# Patient Record
Sex: FEMALE | Race: WHITE | NOT HISPANIC OR LATINO | URBAN - METROPOLITAN AREA
[De-identification: names, ages, dates, MRNs, and addresses within clinical notes are randomized per-mention and may not be internally consistent; named-entity substitution may affect disease eponyms.]

---

## 2017-05-01 ENCOUNTER — OUTPATIENT (OUTPATIENT)
Dept: OUTPATIENT SERVICES | Facility: HOSPITAL | Age: 77
LOS: 1 days | Discharge: HOME | End: 2017-05-01

## 2017-06-28 DIAGNOSIS — Z00.00 ENCOUNTER FOR GENERAL ADULT MEDICAL EXAMINATION WITHOUT ABNORMAL FINDINGS: ICD-10-CM

## 2017-07-31 ENCOUNTER — OUTPATIENT (OUTPATIENT)
Dept: OUTPATIENT SERVICES | Facility: HOSPITAL | Age: 77
LOS: 1 days | Discharge: HOME | End: 2017-07-31

## 2017-07-31 DIAGNOSIS — Z12.31 ENCOUNTER FOR SCREENING MAMMOGRAM FOR MALIGNANT NEOPLASM OF BREAST: ICD-10-CM

## 2017-10-20 ENCOUNTER — OUTPATIENT (OUTPATIENT)
Dept: OUTPATIENT SERVICES | Facility: HOSPITAL | Age: 77
LOS: 1 days | Discharge: HOME | End: 2017-10-20

## 2017-10-20 DIAGNOSIS — E78.01 FAMILIAL HYPERCHOLESTEROLEMIA: ICD-10-CM

## 2017-10-20 DIAGNOSIS — J01.90 ACUTE SINUSITIS, UNSPECIFIED: ICD-10-CM

## 2017-10-20 DIAGNOSIS — E78.00 PURE HYPERCHOLESTEROLEMIA, UNSPECIFIED: ICD-10-CM

## 2018-04-18 ENCOUNTER — OUTPATIENT (OUTPATIENT)
Dept: OUTPATIENT SERVICES | Facility: HOSPITAL | Age: 78
LOS: 1 days | Discharge: HOME | End: 2018-04-18

## 2018-04-18 DIAGNOSIS — R31.9 HEMATURIA, UNSPECIFIED: ICD-10-CM

## 2018-08-07 ENCOUNTER — OUTPATIENT (OUTPATIENT)
Dept: OUTPATIENT SERVICES | Facility: HOSPITAL | Age: 78
LOS: 1 days | Discharge: HOME | End: 2018-08-07

## 2018-08-07 DIAGNOSIS — Z12.31 ENCOUNTER FOR SCREENING MAMMOGRAM FOR MALIGNANT NEOPLASM OF BREAST: ICD-10-CM

## 2018-08-20 ENCOUNTER — OUTPATIENT (OUTPATIENT)
Dept: OUTPATIENT SERVICES | Facility: HOSPITAL | Age: 78
LOS: 1 days | Discharge: HOME | End: 2018-08-20

## 2018-08-20 DIAGNOSIS — R92.8 OTHER ABNORMAL AND INCONCLUSIVE FINDINGS ON DIAGNOSTIC IMAGING OF BREAST: ICD-10-CM

## 2019-08-20 ENCOUNTER — OUTPATIENT (OUTPATIENT)
Dept: OUTPATIENT SERVICES | Facility: HOSPITAL | Age: 79
LOS: 1 days | Discharge: HOME | End: 2019-08-20
Payer: MEDICARE

## 2019-08-20 DIAGNOSIS — Z12.31 ENCOUNTER FOR SCREENING MAMMOGRAM FOR MALIGNANT NEOPLASM OF BREAST: ICD-10-CM

## 2019-08-20 PROCEDURE — 77063 BREAST TOMOSYNTHESIS BI: CPT | Mod: 26

## 2019-08-20 PROCEDURE — 77067 SCR MAMMO BI INCL CAD: CPT | Mod: 26

## 2019-08-29 ENCOUNTER — OUTPATIENT (OUTPATIENT)
Dept: OUTPATIENT SERVICES | Facility: HOSPITAL | Age: 79
LOS: 1 days | Discharge: HOME | End: 2019-08-29
Payer: MEDICARE

## 2019-08-29 DIAGNOSIS — Z12.31 ENCOUNTER FOR SCREENING MAMMOGRAM FOR MALIGNANT NEOPLASM OF BREAST: ICD-10-CM

## 2019-08-29 DIAGNOSIS — R92.8 OTHER ABNORMAL AND INCONCLUSIVE FINDINGS ON DIAGNOSTIC IMAGING OF BREAST: ICD-10-CM

## 2019-08-29 PROCEDURE — G0279: CPT | Mod: 26,LT

## 2019-08-29 PROCEDURE — 76642 ULTRASOUND BREAST LIMITED: CPT | Mod: 26,LT

## 2019-08-29 PROCEDURE — 77065 DX MAMMO INCL CAD UNI: CPT | Mod: 26,LT

## 2019-11-13 ENCOUNTER — OUTPATIENT (OUTPATIENT)
Dept: OUTPATIENT SERVICES | Facility: HOSPITAL | Age: 79
LOS: 1 days | Discharge: HOME | End: 2019-11-13
Payer: MEDICARE

## 2019-11-13 DIAGNOSIS — R51 HEADACHE: ICD-10-CM

## 2019-11-13 DIAGNOSIS — M48.03 SPINAL STENOSIS, CERVICOTHORACIC REGION: ICD-10-CM

## 2019-11-13 PROCEDURE — 72141 MRI NECK SPINE W/O DYE: CPT | Mod: 26

## 2019-11-20 ENCOUNTER — OUTPATIENT (OUTPATIENT)
Dept: OUTPATIENT SERVICES | Facility: HOSPITAL | Age: 79
LOS: 1 days | Discharge: HOME | End: 2019-11-20

## 2019-11-20 DIAGNOSIS — G24.3 SPASMODIC TORTICOLLIS: ICD-10-CM

## 2020-09-09 ENCOUNTER — OUTPATIENT (OUTPATIENT)
Dept: OUTPATIENT SERVICES | Facility: HOSPITAL | Age: 80
LOS: 1 days | Discharge: HOME | End: 2020-09-09
Payer: MEDICARE

## 2020-09-09 DIAGNOSIS — Z12.31 ENCOUNTER FOR SCREENING MAMMOGRAM FOR MALIGNANT NEOPLASM OF BREAST: ICD-10-CM

## 2020-09-09 PROCEDURE — 77067 SCR MAMMO BI INCL CAD: CPT | Mod: 26

## 2020-09-09 PROCEDURE — 77063 BREAST TOMOSYNTHESIS BI: CPT | Mod: 26

## 2021-02-16 ENCOUNTER — OUTPATIENT (OUTPATIENT)
Dept: OUTPATIENT SERVICES | Facility: HOSPITAL | Age: 81
LOS: 1 days | Discharge: HOME | End: 2021-02-16

## 2021-02-16 DIAGNOSIS — Z11.59 ENCOUNTER FOR SCREENING FOR OTHER VIRAL DISEASES: ICD-10-CM

## 2021-02-19 ENCOUNTER — OUTPATIENT (OUTPATIENT)
Dept: OUTPATIENT SERVICES | Facility: HOSPITAL | Age: 81
LOS: 1 days | Discharge: HOME | End: 2021-02-19

## 2021-02-19 VITALS
SYSTOLIC BLOOD PRESSURE: 174 MMHG | RESPIRATION RATE: 18 BRPM | HEART RATE: 86 BPM | TEMPERATURE: 98 F | HEIGHT: 61 IN | OXYGEN SATURATION: 98 % | WEIGHT: 160.06 LBS | DIASTOLIC BLOOD PRESSURE: 84 MMHG

## 2021-02-19 VITALS — RESPIRATION RATE: 17 BRPM | SYSTOLIC BLOOD PRESSURE: 158 MMHG | HEART RATE: 62 BPM | DIASTOLIC BLOOD PRESSURE: 77 MMHG

## 2021-02-19 DIAGNOSIS — Z98.890 OTHER SPECIFIED POSTPROCEDURAL STATES: Chronic | ICD-10-CM

## 2021-02-19 RX ORDER — LEVOTHYROXINE SODIUM 125 MCG
1 TABLET ORAL
Qty: 0 | Refills: 0 | DISCHARGE

## 2021-02-19 RX ORDER — LISINOPRIL 2.5 MG/1
1 TABLET ORAL
Qty: 0 | Refills: 0 | DISCHARGE

## 2021-02-19 RX ORDER — IBUPROFEN 200 MG
1 TABLET ORAL
Qty: 20 | Refills: 0
Start: 2021-02-19

## 2021-02-19 NOTE — ASU PREOP CHECKLIST - BMI (KG/M2)
30.2 Patient contacted regarding COVID-19 diagnosis. Discussed COVID-19 related testing which was pending at this time. Test results were pending. Patient informed of results, if available? Patient and his mother informed that he can get test results from [address] when they become available. Outreach made within 2 business days of discharge: Yes Care Transition Nurse/ Ambulatory Care Manager/ LPN Care Coordinator contacted the patient by telephone to perform post discharge assessment. Verified name and  with patient as identifiers. Provided introduction to self, and explanation of the CTN/ACM/LPN role, and reason for call due to risk factors for infection and/or exposure to COVID-19. Symptoms reviewed with patient who verbalized the following symptoms: no new/worsening symptoms. Due to no new or worsening symptoms encounter was not routed to provider for escalation. Discussed follow-up appointments. If no appointment was previously scheduled, appointment scheduling offered: [facility] follow up appointment(s):  
Future Appointments Date Time Provider Carla Fleming 11/3/2020  9:30 AM [facility]SS [facility] up appointment(s): INOCENCIO Ramos 10/26/20 @ 2PM  
  
Advance Care Planning:  
Does patient have an Advance Directive: currently not on file; patient declined education Patient has following risk factors of: no known risk factors. CTN/ACM/LPN reviewed discharge instructions, medical action plan and red flags such as increased shortness of breath, increasing fever and signs of decompensation with patient and mother who verbalized understanding. Discussed exposure protocols and quarantine with CDC Guidelines What to do if you are sick with coronavirus disease .  Patient and mother were given an opportunity for questions and concerns.  The patient agrees to contact the [facility] exposure line [phone], [facility] department K[address] [address]  ([phone]) and PCP office for questions related to their healthcare. CTN/ACM provided contact information for future needs. Reviewed and educated patient on any new and changed medications related to discharge diagnosis. Patient/family/caregiver given information for [address] and agrees to enroll no Patient's preferred e-mail:  NA 
Patient's preferred phone number: NA Based on Loop alert triggers, patient will be contacted by nurse care manager for worsening symptoms. Plan for follow-up call in 1-2 days based on severity of symptoms and risk factors. Reviewed self-quarantine instructions with patient. Explained to patient self-quarantine is isolating self to one room and using one bathroom, if possible and to avoid contact with family and others for a period of 14 days to avoid spreading illness. Patient and mother verbalizes understanding.

## 2021-02-23 DIAGNOSIS — I10 ESSENTIAL (PRIMARY) HYPERTENSION: ICD-10-CM

## 2021-02-23 DIAGNOSIS — G56.01 CARPAL TUNNEL SYNDROME, RIGHT UPPER LIMB: ICD-10-CM

## 2021-09-23 ENCOUNTER — OUTPATIENT (OUTPATIENT)
Dept: OUTPATIENT SERVICES | Facility: HOSPITAL | Age: 81
LOS: 1 days | Discharge: HOME | End: 2021-09-23
Payer: MEDICARE

## 2021-09-23 DIAGNOSIS — Z12.31 ENCOUNTER FOR SCREENING MAMMOGRAM FOR MALIGNANT NEOPLASM OF BREAST: ICD-10-CM

## 2021-09-23 DIAGNOSIS — Z98.890 OTHER SPECIFIED POSTPROCEDURAL STATES: Chronic | ICD-10-CM

## 2021-09-23 PROBLEM — Z87.898 PERSONAL HISTORY OF OTHER SPECIFIED CONDITIONS: Chronic | Status: ACTIVE | Noted: 2021-02-19

## 2021-09-23 PROCEDURE — 77063 BREAST TOMOSYNTHESIS BI: CPT | Mod: 26

## 2021-09-23 PROCEDURE — 77067 SCR MAMMO BI INCL CAD: CPT | Mod: 26

## 2022-10-24 ENCOUNTER — OUTPATIENT (OUTPATIENT)
Dept: OUTPATIENT SERVICES | Facility: HOSPITAL | Age: 82
LOS: 1 days | Discharge: HOME | End: 2022-10-24

## 2022-10-24 DIAGNOSIS — Z12.31 ENCOUNTER FOR SCREENING MAMMOGRAM FOR MALIGNANT NEOPLASM OF BREAST: ICD-10-CM

## 2022-10-24 DIAGNOSIS — Z98.890 OTHER SPECIFIED POSTPROCEDURAL STATES: Chronic | ICD-10-CM

## 2022-10-24 PROCEDURE — 77067 SCR MAMMO BI INCL CAD: CPT | Mod: 26

## 2022-10-24 PROCEDURE — 77063 BREAST TOMOSYNTHESIS BI: CPT | Mod: 26

## 2023-10-31 ENCOUNTER — OUTPATIENT (OUTPATIENT)
Dept: OUTPATIENT SERVICES | Facility: HOSPITAL | Age: 83
LOS: 1 days | End: 2023-10-31
Payer: MEDICARE

## 2023-10-31 DIAGNOSIS — Z98.890 OTHER SPECIFIED POSTPROCEDURAL STATES: Chronic | ICD-10-CM

## 2023-10-31 DIAGNOSIS — Z12.31 ENCOUNTER FOR SCREENING MAMMOGRAM FOR MALIGNANT NEOPLASM OF BREAST: ICD-10-CM

## 2023-10-31 PROCEDURE — 77063 BREAST TOMOSYNTHESIS BI: CPT | Mod: 26

## 2023-10-31 PROCEDURE — 77067 SCR MAMMO BI INCL CAD: CPT

## 2023-10-31 PROCEDURE — 77063 BREAST TOMOSYNTHESIS BI: CPT

## 2023-10-31 PROCEDURE — 77067 SCR MAMMO BI INCL CAD: CPT | Mod: 26

## 2023-11-01 DIAGNOSIS — Z12.31 ENCOUNTER FOR SCREENING MAMMOGRAM FOR MALIGNANT NEOPLASM OF BREAST: ICD-10-CM

## 2024-01-30 PROBLEM — Z00.00 ENCOUNTER FOR PREVENTIVE HEALTH EXAMINATION: Status: ACTIVE | Noted: 2024-01-30

## 2024-03-08 ENCOUNTER — APPOINTMENT (OUTPATIENT)
Dept: ORTHOPEDIC SURGERY | Facility: CLINIC | Age: 84
End: 2024-03-08

## 2024-04-30 ENCOUNTER — APPOINTMENT (OUTPATIENT)
Dept: ORTHOPEDIC SURGERY | Facility: CLINIC | Age: 84
End: 2024-04-30
Payer: MEDICARE

## 2024-04-30 VITALS — BODY MASS INDEX: 32.1 KG/M2 | WEIGHT: 170 LBS | HEIGHT: 61 IN

## 2024-04-30 DIAGNOSIS — G56.02 CARPAL TUNNEL SYNDROME, LEFT UPPER LIMB: ICD-10-CM

## 2024-04-30 PROCEDURE — 99202 OFFICE O/P NEW SF 15 MIN: CPT

## 2024-04-30 NOTE — PHYSICAL EXAM
[de-identified] : Patient is good range of motion of the left hand.  She has a mildly positive Tinel's medial compression test.  Good thenar strength with no thenar atrophy.  Minimally loss of sensation of the fingers.  No significant degenerative change

## 2024-04-30 NOTE — HISTORY OF PRESENT ILLNESS
[de-identified] : 83-year-old female is having numbness and tingling the left hand.  Does wake her up from sleep at night sometimes.  Bothers her throughout the day at times.  Does not bother her enough to warrant any intervention though.

## 2024-04-30 NOTE — ASSESSMENT
[FreeTextEntry1] : Patient did well from right-sided carpal tunnel release surgery.  She now has carpal tunnel on the left side.  However it is not clinically bothering her enough to warrant any intervention.  She will see me back on an as-needed basis.  Any other issues or problems contact the office the indications for surgery were discussed

## 2024-08-16 ENCOUNTER — APPOINTMENT (OUTPATIENT)
Dept: CARDIOTHORACIC SURGERY | Facility: CLINIC | Age: 84
End: 2024-08-16

## 2024-08-16 ENCOUNTER — APPOINTMENT (OUTPATIENT)
Dept: PULMONOLOGY | Facility: CLINIC | Age: 84
End: 2024-08-16
Payer: MEDICARE

## 2024-08-16 VITALS
OXYGEN SATURATION: 95 % | BODY MASS INDEX: 32 KG/M2 | WEIGHT: 163 LBS | HEIGHT: 60 IN | SYSTOLIC BLOOD PRESSURE: 130 MMHG | DIASTOLIC BLOOD PRESSURE: 70 MMHG | HEART RATE: 83 BPM

## 2024-08-16 VITALS
OXYGEN SATURATION: 95 % | WEIGHT: 163 LBS | HEIGHT: 60 IN | SYSTOLIC BLOOD PRESSURE: 142 MMHG | HEART RATE: 85 BPM | DIASTOLIC BLOOD PRESSURE: 72 MMHG | RESPIRATION RATE: 15 BRPM | TEMPERATURE: 98 F | BODY MASS INDEX: 32 KG/M2

## 2024-08-16 DIAGNOSIS — Z86.39 PERSONAL HISTORY OF OTHER ENDOCRINE, NUTRITIONAL AND METABOLIC DISEASE: ICD-10-CM

## 2024-08-16 DIAGNOSIS — R91.8 OTHER NONSPECIFIC ABNORMAL FINDING OF LUNG FIELD: ICD-10-CM

## 2024-08-16 DIAGNOSIS — J44.9 CHRONIC OBSTRUCTIVE PULMONARY DISEASE, UNSPECIFIED: ICD-10-CM

## 2024-08-16 DIAGNOSIS — I10 ESSENTIAL (PRIMARY) HYPERTENSION: ICD-10-CM

## 2024-08-16 PROCEDURE — 99204 OFFICE O/P NEW MOD 45 MIN: CPT

## 2024-08-16 PROCEDURE — G2211 COMPLEX E/M VISIT ADD ON: CPT

## 2024-08-16 RX ORDER — ATORVASTATIN CALCIUM 20 MG/1
20 TABLET, FILM COATED ORAL
Refills: 0 | Status: ACTIVE | COMMUNITY

## 2024-08-16 RX ORDER — LEVOTHYROXINE SODIUM 88 UG/1
88 TABLET ORAL
Refills: 0 | Status: ACTIVE | COMMUNITY

## 2024-08-16 RX ORDER — CRANBERRY FRUIT EXTRACT 200 MG
CAPSULE ORAL
Refills: 0 | Status: ACTIVE | COMMUNITY

## 2024-08-16 RX ORDER — LISINOPRIL 20 MG/1
20 TABLET ORAL
Refills: 0 | Status: ACTIVE | COMMUNITY

## 2024-08-16 NOTE — PHYSICAL EXAM
[General Appearance - Alert] : alert [General Appearance - In No Acute Distress] : in no acute distress [Sclera] : the sclera and conjunctiva were normal [PERRL With Normal Accommodation] : pupils were equal in size, round, and reactive to light [Extraocular Movements] : extraocular movements were intact [Outer Ear] : the ears and nose were normal in appearance [Oropharynx] : the oropharynx was normal [Neck Appearance] : the appearance of the neck was normal [Jugular Venous Distention Increased] : there was no jugular-venous distention [Neck Cervical Mass (___cm)] : no neck mass was observed [Thyroid Diffuse Enlargement] : the thyroid was not enlarged [Thyroid Nodule] : there were no palpable thyroid nodules [Auscultation Breath Sounds / Voice Sounds] : lungs were clear to auscultation bilaterally [Heart Rate And Rhythm] : heart rate was normal and rhythm regular [Heart Sounds] : normal S1 and S2 [Heart Sounds Gallop] : no gallops [Murmurs] : no murmurs [Heart Sounds Pericardial Friction Rub] : no pericardial rub [Examination Of The Chest] : the chest was normal in appearance [Chest Visual Inspection Thoracic Asymmetry] : no chest asymmetry [Diminished Respiratory Excursion] : normal chest expansion [Bowel Sounds] : normal bowel sounds [Abdomen Tenderness] : non-tender [Abdomen Soft] : soft [Abdomen Mass (___ Cm)] : no abdominal mass palpated [Abnormal Walk] : normal gait [Nail Clubbing] : no clubbing  or cyanosis of the fingernails [Musculoskeletal - Swelling] : no joint swelling seen [Motor Tone] : muscle strength and tone were normal [Skin Color & Pigmentation] : normal skin color and pigmentation [Skin Turgor] : normal skin turgor [] : no rash [Deep Tendon Reflexes (DTR)] : deep tendon reflexes were 2+ and symmetric [Sensation] : the sensory exam was normal to light touch and pinprick [No Focal Deficits] : no focal deficits [Oriented To Time, Place, And Person] : oriented to person, place, and time [Impaired Insight] : insight and judgment were intact [Affect] : the affect was normal

## 2024-08-16 NOTE — REASON FOR VISIT
[Initial] : an initial visit [Abnormal CXR/ Chest CT] : an abnormal CXR/ chest CT [Lung Cancer] : lung cancer [COPD] : COPD

## 2024-08-16 NOTE — HISTORY OF PRESENT ILLNESS
[TextBox_4] : 83 years old presented for above was complaining of cough intermittent saw her PMD had a chest x-ray done which showed left upper lobe opacity was given antibiotics repeat chest x-ray done few weeks later unchanged opacity underwent CT chest on August 1 which showed left upper lobe spiculated nodule and multiple bilateral nodular right lower lobe and left upper lobe underwent PET scan showed uptake in the left upper lobe nodule highly suspicious for malignancy last chest x-ray was 2008 longtime smoker 20 years ago denies history of COPD recurrent bronchitis or shortness of breath

## 2024-08-16 NOTE — DISCUSSION/SUMMARY
[FreeTextEntry1] : Left upper lobe spiculated nodule ex heavy smoker Highly malignancy Multiple nodules PET negative and likely related to left upper lobe mass Plan PFTs Referred for CT surgery  Cardio eval Spoke with surgery

## 2024-08-16 NOTE — PHYSICAL EXAM
[General Appearance - Alert] : alert [General Appearance - In No Acute Distress] : in no acute distress [Sclera] : the sclera and conjunctiva were normal [PERRL With Normal Accommodation] : pupils were equal in size, round, and reactive to light [Extraocular Movements] : extraocular movements were intact [Outer Ear] : the ears and nose were normal in appearance [Oropharynx] : the oropharynx was normal [Neck Appearance] : the appearance of the neck was normal [Neck Cervical Mass (___cm)] : no neck mass was observed [Jugular Venous Distention Increased] : there was no jugular-venous distention [Thyroid Diffuse Enlargement] : the thyroid was not enlarged [Thyroid Nodule] : there were no palpable thyroid nodules [Auscultation Breath Sounds / Voice Sounds] : lungs were clear to auscultation bilaterally [Heart Sounds] : normal S1 and S2 [Heart Rate And Rhythm] : heart rate was normal and rhythm regular [Heart Sounds Gallop] : no gallops [Murmurs] : no murmurs [Heart Sounds Pericardial Friction Rub] : no pericardial rub [Examination Of The Chest] : the chest was normal in appearance [Chest Visual Inspection Thoracic Asymmetry] : no chest asymmetry [Diminished Respiratory Excursion] : normal chest expansion [Bowel Sounds] : normal bowel sounds [Abdomen Tenderness] : non-tender [Abdomen Soft] : soft [Abdomen Mass (___ Cm)] : no abdominal mass palpated [Abnormal Walk] : normal gait [Nail Clubbing] : no clubbing  or cyanosis of the fingernails [Musculoskeletal - Swelling] : no joint swelling seen [Motor Tone] : muscle strength and tone were normal [Skin Color & Pigmentation] : normal skin color and pigmentation [Skin Turgor] : normal skin turgor [] : no rash [Deep Tendon Reflexes (DTR)] : deep tendon reflexes were 2+ and symmetric [Sensation] : the sensory exam was normal to light touch and pinprick [No Focal Deficits] : no focal deficits [Impaired Insight] : insight and judgment were intact [Oriented To Time, Place, And Person] : oriented to person, place, and time [Affect] : the affect was normal

## 2024-08-19 ENCOUNTER — APPOINTMENT (OUTPATIENT)
Dept: CARDIOLOGY | Facility: CLINIC | Age: 84
End: 2024-08-19
Payer: MEDICARE

## 2024-08-19 VITALS
BODY MASS INDEX: 32.59 KG/M2 | SYSTOLIC BLOOD PRESSURE: 116 MMHG | HEART RATE: 66 BPM | WEIGHT: 166 LBS | DIASTOLIC BLOOD PRESSURE: 70 MMHG | HEIGHT: 60 IN

## 2024-08-19 DIAGNOSIS — R91.8 OTHER NONSPECIFIC ABNORMAL FINDING OF LUNG FIELD: ICD-10-CM

## 2024-08-19 DIAGNOSIS — I10 ESSENTIAL (PRIMARY) HYPERTENSION: ICD-10-CM

## 2024-08-19 DIAGNOSIS — E78.5 HYPERLIPIDEMIA, UNSPECIFIED: ICD-10-CM

## 2024-08-19 DIAGNOSIS — Z87.891 PERSONAL HISTORY OF NICOTINE DEPENDENCE: ICD-10-CM

## 2024-08-19 DIAGNOSIS — Z01.810 ENCOUNTER FOR PREPROCEDURAL CARDIOVASCULAR EXAMINATION: ICD-10-CM

## 2024-08-19 PROCEDURE — 93000 ELECTROCARDIOGRAM COMPLETE: CPT

## 2024-08-19 PROCEDURE — 99204 OFFICE O/P NEW MOD 45 MIN: CPT | Mod: 25

## 2024-08-21 ENCOUNTER — RESULT REVIEW (OUTPATIENT)
Age: 84
End: 2024-08-21

## 2024-08-22 ENCOUNTER — APPOINTMENT (OUTPATIENT)
Dept: PULMONOLOGY | Facility: HOSPITAL | Age: 84
End: 2024-08-22

## 2024-08-23 ENCOUNTER — APPOINTMENT (OUTPATIENT)
Dept: CARDIOLOGY | Facility: CLINIC | Age: 84
End: 2024-08-23
Payer: MEDICARE

## 2024-08-23 PROCEDURE — 93306 TTE W/DOPPLER COMPLETE: CPT

## 2024-08-23 NOTE — DATA REVIEWED
[FreeTextEntry1] : Full Report IMPRESSION:   1.  FDG avid dominant 2.8 cm spiculated solid lung nodule in the left upper lobe, probably representing a primary lung tumor. Histologic correlation is suggested.   2.  Remaining multiple solid and subsolid lung nodules are not FDG avid and indeterminate. Continued close imaging surveillance by chest CT is suggested..   3.  No FDG avid intrathoracic adenopathy or distant metastases.     Diagnostic confidence level used in this report:   Consistent with/compatible with or no modifier - greater than 98% Most likely - greater than 90% Likely/probably - greater than 75% Possibly 50% Less likely - less than 25% Unlikely - less than 5%     PET CT FDG SKULL BASE MID THIGH WITHOUT IV CONTRAST   CLINICAL INDICATION: 83-year-old female with spiculated lung nodule on recent chest CT   COMPARISON: 8/1/2024 chest CT   TECHNIQUE: Radiopharmaceutical: 13.1 mCi IV F-18 FDG Uptake time: 1h5min POC glucose: 102 mg/dL  Field of view: Skull base to mid-thigh Contrast: IV contrast was not administered. CT protocol: Designed for attenuation correction of PET and used for anatomic localization of PET findings. Not optimized for anatomical resolution and contrast. This low-dose  CT cannot replace state-of-the-art diagnostic CT scans with dedicated protocols for different body parts and indications. Reporting: Standardized uptake values (SUV) are normalized to lean body mass (LBM) and indicate the highest activity (SUVmax) at a given site. Note that LBM normalization may result in lower SUV than body-weight normalization.     FINDINGS:   Head and neck:   There is no suspicious FDG uptake in the head and neck.   No head and neck lymphadenopathy.   Prior total thyroidectomy.   Chest:   Increased FDG uptake is seen within the dominant spiculated solid lung nodule in the left upper lobe, 2.8 x 2.3 cm on series 102 image 54, SUV 4.8, probably presenting a primary lung tumor.   No measurable FDG uptake above background activity within the remaining bilateral lung nodules, with the smaller nodules not appreciated with certainty on the current study secondary to differences in technique. Some of these are annotated or measured on series 102, including a 9 mm nodule in the left upper lobe with a central lucency image 43, a 3.8 x 2 cm subsolid consolidative opacity in the posterior right lower lobe image 65, a 10 mm solid lung nodule in the middle lobe image 69, and a 9 mm subpleural solid nodule in the posterior basilar left lower lobe image 85.   There is no hilar, mediastinal, supraclavicular or axillary lymphadenopathy. No pleural or pericardial effusions.  Mild coronary artery calcifications.   Abdomen and pelvis:   There is no suspicious FDG uptake in the abdomen/pelvis.   On CT, there is a large 7.0 x 5.5 cm fat-containing left adrenal mass, consistent with a myelolipoma. Multiple scattered non-FDG avid low-attenuation liver lesions are seen, most of which represent cysts measuring up to 2.3 cm in the left hepatic lobe. Additional 2.8 cm subcapsular low-attenuation lesion at the periphery of the right hepatic lobe may represent a hemangioma. Remaining solid abdominal organs are unremarkable. Prior hysterectomy. No lymphadenopathy.  No acute bowel-related abnormality.    Vascular calcifications of the aorta and its major branches.    Musculoskeletal and extremities:   There are no suspicious FDG-avid osseous lesions.   No aggressive osseous lesions are seen on CT.     Electronic Signature: I personally reviewed the images and agree with this report. Final Report: Dictated by  and Signed by Attending Drew Shankar MD 8/14/2024 11:42 AM

## 2024-08-23 NOTE — HISTORY OF PRESENT ILLNESS
[FreeTextEntry1] :  Ms. JAIRON HALL is a 83 year F, former smoker, quit 20 years ago, that arrives today for a consultation for a SYLVIA 2.8 X 2.3 cm nodule. with multiple soft tissue bilateral lung nodules. Patient was seen by their PMD for work up for a cough and incidental finding on xray, she was treated with abx for a SYLVIA opacity which yielded no improvement on lesion Patient then underwent CT chest which revealed a spiculated nodule in the SYLVIA, subsequently patient had a PET CT.  PMHX HTN, HLD, Hypothyroidism S/P Thyroid cancer s/p thyroidectomy, tx with radioactive iodine. Here today for surgical discussion.    Their Healthcare team is as follows: PMD: Dr. Kraft Cardiologist: None Pulmonologist: Reinaldo    ECOG, Independent, Partially Independent, Fully Dependent Lives with alone Former smoker- 80 pack years COVID History- Vaccinated 2021  Denies major psychiatric history Denies Family History of lung Cancer

## 2024-08-23 NOTE — ASSESSMENT
[FreeTextEntry1] : Ms. JAIRON HALL is a 83 year F, former smoker, quit 20 years ago, that arrives today for a consultation for a SYLVIA 2.8 X 2.3 cm nodule. with multiple soft tissue bilateral lung nodules. Patient was seen by their PMD for work up for a cough and incidental finding on xray, she was treated with abx for a SYLVIA opacity which yielded no improvement on lesion Patient then underwent CT chest which revealed a spiculated nodule in the SYLVIA, subsequently patient had a PET CT.  PMHX HTN, HLD, Hypothyroidism S/P Thyroid cancer s/p thyroidectomy, tx with radioactive iodine. Here today for surgical discussion. PET /CT images reviewed and discussed with patient and family. Alternatives discussed such as endobronchial Biopsy vs FNA Biopsy vs Surgical Resection, including associated risks and benefits. Patient amenable to surgery. Patient will bring in discs (@ RR) for uploading.  -Plan Bronchoscopy, Robotic assisted left thoracoscopy upper lobe wedge resection, possible segmentectomy / lobectomy mediastinal node dissection on 8/28/2004 Needs PAST Cardiac Clearance (has an appointment on 8/19/2024) PFTs I, Mak Worley saw, examined and reviewed the diagnostic images on patient:  JAIRON HALL on 08/16/2024 and agreed with my Nurse Practitioner's clinical note, physical exam findings and treatment plan.

## 2024-08-26 NOTE — HISTORY OF PRESENT ILLNESS
[FreeTextEntry1] : Ms. Gutierrez is an 83-year-old woman with history of HTN, HLD, hypothyroidism, prior tobacco use, and lung nodule presenting for pre-operative risk assessment.  Patient follows with Dr. Isaías Cortez and was last seen 8/16 for PET positive lung nodule. She saw Dr. Nacho Worley that day and is now planned for bronchoscopy, robotic assisted left thoracoscopy, upper lobe wedge resection, possible segmentectomy/lobectomy, and MSLND 8/28/24.  CT chest 8/2024 showed a SYLVIA spiculated nodule and multiple bilateral nodular RLL and SYLVIA nodules. PET showed uptake in the SYLVIA nodule, highly suspicious for malignancy. Mild coronary atherosclerosis noted.  She states that despite her lung nodule diagnosis she feels quite well. No chest pain or dyspnea on exertion. Can climb 2 flights of stairs without chest pain or effort limiting dyspnea.  ECG shows NSR, normal axis, borderline LVH by aVL  Meds: - Atorva 20mg - Lisinopril 20mg - Synthroid 88mcg - VitD3, Ca

## 2024-08-26 NOTE — ASSESSMENT
[FreeTextEntry1] : Ms. Gutierrez is an 83-year-old woman with history of HTN, HLD, hypothyroidism, prior tobacco use, and lung nodule presenting for pre-operative risk assessment.  Impression: (1) Pre-operative risk assessment (2) Lung nodule, high suspicion for malignancy (3) Prior tobacco use (4) Dyslipidemia, no recent lipid on file (5) Hypertension, well controlled on lisinopril 20mg  Plan: - Patient denies anginal complaints, does not endorse/exhibit signs of heart failure, and can achieve >4 METs without chest pain or effort limiting dyspnea. Recommend baseline TTE. If no significant systolic dysfunction or valvular disease, may proceed at an intermediate risk for MACE. She does have history of CAD as determined by presence of mild CAC on CT chest. Continue statin periprocedurally.  Follow up with me in 3 months for ongoing cardiac care  *** ADDENDUM ***  TTE reveals normal systolic function with no significant valvular disease. Patient may proceed at an intermediate risk for MACE and is considered optimized from the cardiac standpoint.

## 2024-08-27 NOTE — ASU PATIENT PROFILE, ADULT - NSICDXPASTMEDICALHX_GEN_ALL_CORE_FT
PAST MEDICAL HISTORY:  History of medical problems hypercholesteremia, htn, thyroid disease    Pulmonary nodule, left

## 2024-08-27 NOTE — ASU PATIENT PROFILE, ADULT - NS PREOP MEDICATION GIVEN
Patient directed to follow instructions provided by MD/ in pre-surgical testing regarding medications

## 2024-08-28 ENCOUNTER — INPATIENT (INPATIENT)
Facility: HOSPITAL | Age: 84
LOS: 1 days | Discharge: HOME HEALTH | DRG: 204 | End: 2024-08-30
Attending: THORACIC SURGERY (CARDIOTHORACIC VASCULAR SURGERY) | Admitting: THORACIC SURGERY (CARDIOTHORACIC VASCULAR SURGERY)
Payer: MEDICARE

## 2024-08-28 ENCOUNTER — APPOINTMENT (OUTPATIENT)
Dept: CARDIOTHORACIC SURGERY | Facility: HOSPITAL | Age: 84
End: 2024-08-28

## 2024-08-28 ENCOUNTER — APPOINTMENT (OUTPATIENT)
Dept: PULMONOLOGY | Facility: HOSPITAL | Age: 84
End: 2024-08-28

## 2024-08-28 VITALS
HEIGHT: 61 IN | HEART RATE: 58 BPM | OXYGEN SATURATION: 98 % | DIASTOLIC BLOOD PRESSURE: 70 MMHG | SYSTOLIC BLOOD PRESSURE: 135 MMHG | TEMPERATURE: 98 F | RESPIRATION RATE: 17 BRPM | WEIGHT: 166.89 LBS

## 2024-08-28 DIAGNOSIS — R91.8 OTHER NONSPECIFIC ABNORMAL FINDING OF LUNG FIELD: ICD-10-CM

## 2024-08-28 DIAGNOSIS — Z98.890 OTHER SPECIFIED POSTPROCEDURAL STATES: Chronic | ICD-10-CM

## 2024-08-28 LAB
ABO RH CONFIRMATION: SIGNIFICANT CHANGE UP
ANION GAP SERPL CALC-SCNC: 11 MMOL/L — SIGNIFICANT CHANGE UP (ref 7–14)
BUN SERPL-MCNC: 21 MG/DL — HIGH (ref 10–20)
CALCIUM SERPL-MCNC: 8.6 MG/DL — SIGNIFICANT CHANGE UP (ref 8.4–10.5)
CHLORIDE SERPL-SCNC: 104 MMOL/L — SIGNIFICANT CHANGE UP (ref 98–110)
CO2 SERPL-SCNC: 22 MMOL/L — SIGNIFICANT CHANGE UP (ref 17–32)
CREAT SERPL-MCNC: 0.6 MG/DL — LOW (ref 0.7–1.5)
EGFR: 89 ML/MIN/1.73M2 — SIGNIFICANT CHANGE UP
GAS PNL BLDA: SIGNIFICANT CHANGE UP
GLUCOSE SERPL-MCNC: 127 MG/DL — HIGH (ref 70–99)
HCT VFR BLD CALC: 41.1 % — SIGNIFICANT CHANGE UP (ref 37–47)
HGB BLD-MCNC: 13.6 G/DL — SIGNIFICANT CHANGE UP (ref 12–16)
MAGNESIUM SERPL-MCNC: 1.7 MG/DL — LOW (ref 1.8–2.4)
MCHC RBC-ENTMCNC: 30.6 PG — SIGNIFICANT CHANGE UP (ref 27–31)
MCHC RBC-ENTMCNC: 33.1 G/DL — SIGNIFICANT CHANGE UP (ref 32–37)
MCV RBC AUTO: 92.4 FL — SIGNIFICANT CHANGE UP (ref 81–99)
NRBC # BLD: 0 /100 WBCS — SIGNIFICANT CHANGE UP (ref 0–0)
PLATELET # BLD AUTO: 213 K/UL — SIGNIFICANT CHANGE UP (ref 130–400)
PMV BLD: 10.6 FL — HIGH (ref 7.4–10.4)
POTASSIUM SERPL-MCNC: 4 MMOL/L — SIGNIFICANT CHANGE UP (ref 3.5–5)
POTASSIUM SERPL-SCNC: 4 MMOL/L — SIGNIFICANT CHANGE UP (ref 3.5–5)
RBC # BLD: 4.45 M/UL — SIGNIFICANT CHANGE UP (ref 4.2–5.4)
RBC # FLD: 14.4 % — SIGNIFICANT CHANGE UP (ref 11.5–14.5)
SODIUM SERPL-SCNC: 137 MMOL/L — SIGNIFICANT CHANGE UP (ref 135–146)
WBC # BLD: 11.74 K/UL — HIGH (ref 4.8–10.8)
WBC # FLD AUTO: 11.74 K/UL — HIGH (ref 4.8–10.8)

## 2024-08-28 PROCEDURE — 71045 X-RAY EXAM CHEST 1 VIEW: CPT

## 2024-08-28 PROCEDURE — 32663 THORACOSCOPY W/LOBECTOMY: CPT | Mod: LT

## 2024-08-28 PROCEDURE — 97162 PT EVAL MOD COMPLEX 30 MIN: CPT | Mod: GP

## 2024-08-28 PROCEDURE — 88307 TISSUE EXAM BY PATHOLOGIST: CPT

## 2024-08-28 PROCEDURE — 88381 MICRODISSECTION MANUAL: CPT

## 2024-08-28 PROCEDURE — 97110 THERAPEUTIC EXERCISES: CPT | Mod: GP

## 2024-08-28 PROCEDURE — 84295 ASSAY OF SERUM SODIUM: CPT

## 2024-08-28 PROCEDURE — S2900 ROBOTIC SURGICAL SYSTEM: CPT | Mod: NC

## 2024-08-28 PROCEDURE — 36415 COLL VENOUS BLD VENIPUNCTURE: CPT

## 2024-08-28 PROCEDURE — 83735 ASSAY OF MAGNESIUM: CPT

## 2024-08-28 PROCEDURE — 32663 THORACOSCOPY W/LOBECTOMY: CPT | Mod: AS,LT

## 2024-08-28 PROCEDURE — S2900: CPT

## 2024-08-28 PROCEDURE — 81235 EGFR GENE COM VARIANTS: CPT

## 2024-08-28 PROCEDURE — 80048 BASIC METABOLIC PNL TOTAL CA: CPT

## 2024-08-28 PROCEDURE — 88313 SPECIAL STAINS GROUP 2: CPT

## 2024-08-28 PROCEDURE — 85018 HEMOGLOBIN: CPT

## 2024-08-28 PROCEDURE — 88312 SPECIAL STAINS GROUP 1: CPT | Mod: 26

## 2024-08-28 PROCEDURE — 94010 BREATHING CAPACITY TEST: CPT

## 2024-08-28 PROCEDURE — 88309 TISSUE EXAM BY PATHOLOGIST: CPT

## 2024-08-28 PROCEDURE — 83605 ASSAY OF LACTIC ACID: CPT

## 2024-08-28 PROCEDURE — 88313 SPECIAL STAINS GROUP 2: CPT | Mod: 26

## 2024-08-28 PROCEDURE — 88342 IMHCHEM/IMCYTCHM 1ST ANTB: CPT | Mod: 26

## 2024-08-28 PROCEDURE — 85027 COMPLETE CBC AUTOMATED: CPT

## 2024-08-28 PROCEDURE — 32674 THORACOSCOPY LYMPH NODE EXC: CPT

## 2024-08-28 PROCEDURE — 88342 IMHCHEM/IMCYTCHM 1ST ANTB: CPT

## 2024-08-28 PROCEDURE — 88307 TISSUE EXAM BY PATHOLOGIST: CPT | Mod: 26

## 2024-08-28 PROCEDURE — 88360 TUMOR IMMUNOHISTOCHEM/MANUAL: CPT

## 2024-08-28 PROCEDURE — 85014 HEMATOCRIT: CPT

## 2024-08-28 PROCEDURE — 84132 ASSAY OF SERUM POTASSIUM: CPT

## 2024-08-28 PROCEDURE — 88312 SPECIAL STAINS GROUP 1: CPT

## 2024-08-28 PROCEDURE — 88309 TISSUE EXAM BY PATHOLOGIST: CPT | Mod: 26

## 2024-08-28 PROCEDURE — 88341 IMHCHEM/IMCYTCHM EA ADD ANTB: CPT | Mod: 26

## 2024-08-28 PROCEDURE — 82803 BLOOD GASES ANY COMBINATION: CPT

## 2024-08-28 PROCEDURE — 88341 IMHCHEM/IMCYTCHM EA ADD ANTB: CPT

## 2024-08-28 PROCEDURE — C1889: CPT

## 2024-08-28 PROCEDURE — 71045 X-RAY EXAM CHEST 1 VIEW: CPT | Mod: 26

## 2024-08-28 PROCEDURE — 32674 THORACOSCOPY LYMPH NODE EXC: CPT | Mod: AS

## 2024-08-28 PROCEDURE — 84100 ASSAY OF PHOSPHORUS: CPT

## 2024-08-28 PROCEDURE — 82330 ASSAY OF CALCIUM: CPT

## 2024-08-28 RX ORDER — HYDRALAZINE HCL 50 MG
10 TABLET ORAL EVERY 4 HOURS
Refills: 0 | Status: DISCONTINUED | OUTPATIENT
Start: 2024-08-28 | End: 2024-08-30

## 2024-08-28 RX ORDER — SODIUM CHLORIDE 9 MG/ML
1000 INJECTION INTRAMUSCULAR; INTRAVENOUS; SUBCUTANEOUS
Refills: 0 | Status: DISCONTINUED | OUTPATIENT
Start: 2024-08-28 | End: 2024-08-30

## 2024-08-28 RX ORDER — ONDANSETRON 2 MG/ML
4 INJECTION, SOLUTION INTRAMUSCULAR; INTRAVENOUS EVERY 6 HOURS
Refills: 0 | Status: DISCONTINUED | OUTPATIENT
Start: 2024-08-28 | End: 2024-08-30

## 2024-08-28 RX ORDER — HEPARIN SODIUM,BOVINE 1000/ML
5000 VIAL (ML) INJECTION ONCE
Refills: 0 | Status: COMPLETED | OUTPATIENT
Start: 2024-08-28 | End: 2024-08-28

## 2024-08-28 RX ORDER — ACETAMINOPHEN 325 MG/1
1000 TABLET ORAL ONCE
Refills: 0 | Status: COMPLETED | OUTPATIENT
Start: 2024-08-28 | End: 2024-08-28

## 2024-08-28 RX ORDER — HEPARIN SODIUM,BOVINE 1000/ML
5000 VIAL (ML) INJECTION EVERY 8 HOURS
Refills: 0 | Status: DISCONTINUED | OUTPATIENT
Start: 2024-08-28 | End: 2024-08-30

## 2024-08-28 RX ORDER — KETOROLAC TROMETHAMINE 30 MG/ML
15 INJECTION, SOLUTION INTRAMUSCULAR EVERY 8 HOURS
Refills: 0 | Status: DISCONTINUED | OUTPATIENT
Start: 2024-08-28 | End: 2024-08-29

## 2024-08-28 RX ORDER — HYDROMORPHONE HYDROCHLORIDE 2 MG/1
30 TABLET ORAL
Refills: 0 | Status: DISCONTINUED | OUTPATIENT
Start: 2024-08-28 | End: 2024-08-30

## 2024-08-28 RX ORDER — POLYETHYLENE GLYCOL 3350 17 G/17G
17 POWDER, FOR SOLUTION ORAL DAILY
Refills: 0 | Status: DISCONTINUED | OUTPATIENT
Start: 2024-08-29 | End: 2024-08-30

## 2024-08-28 RX ORDER — PANTOPRAZOLE SODIUM 40 MG
40 TABLET, DELAYED RELEASE (ENTERIC COATED) ORAL DAILY
Refills: 0 | Status: DISCONTINUED | OUTPATIENT
Start: 2024-08-28 | End: 2024-08-30

## 2024-08-28 RX ORDER — CEFAZOLIN SODIUM 2 G/100ML
1000 INJECTION, SOLUTION INTRAVENOUS EVERY 8 HOURS
Refills: 0 | Status: COMPLETED | OUTPATIENT
Start: 2024-08-28 | End: 2024-08-29

## 2024-08-28 RX ORDER — NALOXONE HCL 1 MG/ML
0.1 VIAL (ML) INJECTION
Refills: 0 | Status: DISCONTINUED | OUTPATIENT
Start: 2024-08-28 | End: 2024-08-30

## 2024-08-28 RX ORDER — GABAPENTIN 100 MG
300 CAPSULE ORAL ONCE
Refills: 0 | Status: COMPLETED | OUTPATIENT
Start: 2024-08-28 | End: 2024-08-28

## 2024-08-28 RX ORDER — LEVOTHYROXINE SODIUM 100 MCG
88 TABLET ORAL DAILY
Refills: 0 | Status: DISCONTINUED | OUTPATIENT
Start: 2024-08-29 | End: 2024-08-30

## 2024-08-28 RX ORDER — LISINOPRIL 10 MG/1
20 TABLET ORAL DAILY
Refills: 0 | Status: DISCONTINUED | OUTPATIENT
Start: 2024-08-29 | End: 2024-08-30

## 2024-08-28 RX ORDER — CHLORHEXIDINE GLUCONATE 40 MG/ML
1 SOLUTION TOPICAL DAILY
Refills: 0 | Status: DISCONTINUED | OUTPATIENT
Start: 2024-08-28 | End: 2024-08-30

## 2024-08-28 RX ORDER — SENNA 187 MG
2 TABLET ORAL AT BEDTIME
Refills: 0 | Status: DISCONTINUED | OUTPATIENT
Start: 2024-08-29 | End: 2024-08-30

## 2024-08-28 RX ADMIN — Medication 300 MILLIGRAM(S): at 07:04

## 2024-08-28 RX ADMIN — ACETAMINOPHEN 400 MILLIGRAM(S): 325 TABLET ORAL at 17:39

## 2024-08-28 RX ADMIN — ACETAMINOPHEN 1000 MILLIGRAM(S): 325 TABLET ORAL at 17:55

## 2024-08-28 RX ADMIN — HYDROMORPHONE HYDROCHLORIDE 30 MILLILITER(S): 2 TABLET ORAL at 13:20

## 2024-08-28 RX ADMIN — Medication 5000 UNIT(S): at 07:04

## 2024-08-28 RX ADMIN — Medication 5000 UNIT(S): at 21:22

## 2024-08-28 RX ADMIN — CHLORHEXIDINE GLUCONATE 1 APPLICATION(S): 40 SOLUTION TOPICAL at 13:19

## 2024-08-28 RX ADMIN — Medication 5000 UNIT(S): at 13:18

## 2024-08-28 RX ADMIN — Medication 10 MILLIGRAM(S): at 18:16

## 2024-08-28 RX ADMIN — ACETAMINOPHEN 1000 MILLIGRAM(S): 325 TABLET ORAL at 07:04

## 2024-08-28 RX ADMIN — Medication 40 MILLIGRAM(S): at 13:18

## 2024-08-28 RX ADMIN — SODIUM CHLORIDE 10 MILLILITER(S): 9 INJECTION INTRAMUSCULAR; INTRAVENOUS; SUBCUTANEOUS at 21:22

## 2024-08-28 RX ADMIN — CEFAZOLIN SODIUM 100 MILLIGRAM(S): 2 INJECTION, SOLUTION INTRAVENOUS at 16:16

## 2024-08-28 RX ADMIN — SODIUM CHLORIDE 10 MILLILITER(S): 9 INJECTION INTRAMUSCULAR; INTRAVENOUS; SUBCUTANEOUS at 13:19

## 2024-08-28 RX ADMIN — Medication 40 MILLIGRAM(S): at 21:22

## 2024-08-28 NOTE — CHART NOTE - NSCHARTNOTEFT_GEN_A_CORE
Surgery Post-Op Note    CC:  Pre-Op Dx: Nonspecific abnormal findings on radiological and other examination of lung field    Mass of left lung    Lung cancer      Procedure: Lobectomy, lung, upper lobe, left, robot-assisted    Bronchoscopy      Surgeon: Dr. Nacho Worley    Vital Signs Last 24 Hrs  T(C): 36.7 (28 Aug 2024 17:00), Max: 36.7 (28 Aug 2024 17:00)  T(F): 98.1 (28 Aug 2024 17:00), Max: 98.1 (28 Aug 2024 17:00)  HR: 70 (28 Aug 2024 18:00) (56 - 75)  BP: 174/81 (28 Aug 2024 18:00) (122/69 - 174/81)  BP(mean): 116 (28 Aug 2024 18:00) (101 - 116)  RR: 20 (28 Aug 2024 18:00) (16 - 27)  SpO2: 97% (28 Aug 2024 18:00) (93% - 100%)    Parameters below as of 28 Aug 2024 18:00  Patient On (Oxygen Delivery Method): nasal cannula  O2 Flow (L/min): 2      Physical Exam:  General: NAD, resting comfortably in bed  Pulmonary: Nonlabored breathing, no respiratory distress, L CT on SXN and mild AL, sanguinous output  Cardiovascular: NSR  Abdominal: soft, NT/ND,  Extremities: WWP, normal strength  Skin: no hematoma, rash, ecchymosis  Neuro: A/O x 3, CNs II-XII grossly intact, normal motor/sensation, no focal deficits      LABS:                        13.6   11.74 )-----------( 213      ( 28 Aug 2024 13:17 )             41.1     08-28    137  |  104  |  21<H>  ----------------------------<  127<H>  4.0   |  22  |  0.6<L>    Ca    8.6      28 Aug 2024 13:17  Mg     1.7     08-28        CAPILLARY BLOOD GLUCOSE          Urinalysis Basic - ( 28 Aug 2024 13:17 )    Color: x / Appearance: x / SG: x / pH: x  Gluc: 127 mg/dL / Ketone: x  / Bili: x / Urobili: x   Blood: x / Protein: x / Nitrite: x   Leuk Esterase: x / RBC: x / WBC x   Sq Epi: x / Non Sq Epi: x / Bacteria: x        Radiology and Additional Studies:    Assessment:83y Female Lobectomy, lung, upper lobe, left, robot-assisted,. Bronchoscopy    Plan:  WS CT @midnight  Pain/nausea control PRN  Home meds  Incentive spirometer/OOB/Ambulate  Vitals per protocol  f/u labs

## 2024-08-28 NOTE — DISCHARGE NOTE PROVIDER - NSDCMRMEDTOKEN_GEN_ALL_CORE_FT
atorvastatin 40 mg oral tablet: 1 tab(s) orally once a day (at bedtime)  levothyroxine 88 mcg (0.088 mg) oral capsule: 1 cap(s) orally once a day  lisinopril 20 mg oral tablet: 1 tab(s) orally once a day   acetaminophen 500 mg oral tablet: 2 tab(s) orally every 6 hours as needed for  moderate pain  atorvastatin 40 mg oral tablet: 1 tab(s) orally once a day (at bedtime)  levothyroxine 88 mcg (0.088 mg) oral capsule: 1 cap(s) orally once a day  lisinopril 20 mg oral tablet: 1 tab(s) orally once a day

## 2024-08-28 NOTE — DISCHARGE NOTE PROVIDER - NSDCCPTREATMENT_GEN_ALL_CORE_FT
PRINCIPAL PROCEDURE  Procedure: Lobectomy, lung, upper lobe, left, robot-assisted  Findings and Treatment:       SECONDARY PROCEDURE  Procedure: Bronchoscopy  Findings and Treatment:

## 2024-08-28 NOTE — BRIEF OPERATIVE NOTE - NSICDXBRIEFPROCEDURE_GEN_ALL_CORE_FT
PROCEDURES:  Lobectomy, lung, upper lobe, left, robot-assisted 28-Aug-2024 11:54:52  Rica Kunz  Bronchoscopy 28-Aug-2024 11:55:01  Rica Kunz

## 2024-08-28 NOTE — DISCHARGE NOTE PROVIDER - CARE PROVIDER_API CALL
Mak Crespo  Thoracic and Cardiac Surgery  [address] [place] [address], [address]  [place] [address]  Phone: [phone]  Fax: [phone]  Follow Up Time:    Mak Crespo  Thoracic and Cardiac Surgery  [address] [place] [address], [address]  [place] [address]  Phone: [phone]  Fax: [phone]  Scheduled Appointment: 09/10/2024

## 2024-08-28 NOTE — DISCHARGE NOTE PROVIDER - NSDCHHCONTACT_GEN_ALL_CORE_FT
Spontaneous, unlabored and symmetrical
As certified below, I, or a nurse practitioner or physician assistant working with me, had a face-to-face encounter that meets the physician face-to-face encounter requirements.

## 2024-08-28 NOTE — DISCHARGE NOTE PROVIDER - NSDCFUSCHEDAPPT_GEN_ALL_CORE_FT
[facility]  [facility]  Scheduled Appointment: 09/10/2024

## 2024-08-28 NOTE — PRE-ANESTHESIA EVALUATION ADULT - RESPIRATORY RATE (BREATHS/MIN)
17 Eucrisa Counseling: Patient may experience a mild burning sensation during topical application. Eucrisa is not approved in children less than 2 years of age.

## 2024-08-28 NOTE — DISCHARGE NOTE PROVIDER - HOSPITAL COURSE
Ms. JAIRON HALL is a 83 year female, former smoker (quit 20 years ago), ECOG 0 with PMHx: HTN, HLD, Hypothyroidism S/P Thyroid cancer s/p thyroidectomy, tx with radioactive iodine. Patient referred for a consultation for a SYLVIA 2.8 X 2.3 cm nodule with multiple soft tissue bilateral lung nodules. Patient was seen by their PMD for work up for a cough and incidental finding on xray, she was treated with abx for a SYLVIA opacity which yielded no improvement on lesion Patient then underwent CT chest which revealed a spiculated nodule in the SYLVIA, subsequently patient had a PET CT. Patient presented on 8/28/24 for robotic assisted left VATs. Intra-operative pathology of mediastinal lymph node returned positive for malignancy and patient underwent left upper lobe lobectomy. Post-operatively...     Their Healthcare team is as follows:  PMD: Dr. Kraft  Cardiologist: None  Pulmonologist: Reinaldo   Ms. JAIRON HALL is a 83 year female, former smoker (quit 20 years ago), ECOG 0 with PMHx: HTN, HLD, Hypothyroidism S/P Thyroid cancer s/p thyroidectomy, tx with radioactive iodine. Patient referred for a consultation for a SYLVIA 2.8 X 2.3 cm nodule with multiple soft tissue bilateral lung nodules. Patient was seen by their PMD for work up for a cough and incidental finding on xray, she was treated with abx for a SYLVIA opacity which yielded no improvement on lesion Patient then underwent CT chest which revealed a spiculated nodule in the SYLVIA, subsequently patient had a PET CT. Patient presented on 8/28/24 for robotic assisted left VATs. Intra-operative pathology of mediastinal lymph node returned positive for malignancy and patient underwent left upper lobe lobectomy. Post-operatively, patient had an uneventful hospital course. Her chest tube was removed on POD#2 without complication. Patient was discharged home in stable condition on POD#2 with instructions to follow up with Dr. Nacho Worley on  9/10/2024 @ 12:30pm.     Their Healthcare team is as follows:  PMD: Dr. Kraft  Cardiologist: None  Pulmonologist: Reinaldo

## 2024-08-28 NOTE — PRE-ANESTHESIA EVALUATION ADULT - NSANTHOSAYNRD_GEN_A_CORE
December 1, 2017      AUGUSTINA Cruz  [address]  [address]  [facility] [address]           [place] [facility]  [address]  [place] [address]  Phone: [phone]          Patient: Frank Becerra   MR Number: [id number]   YOB: 1983   Date of Visit: 12/1/2017       Dear Jose Moulton:    Thank you for referring Frank Becerra to me for evaluation. Attached you will find relevant portions of my assessment and plan of care.    If you have questions, please do not hesitate to call me. I look forward to following Frank Becerra along with you.    Sincerely,    Denise Roberts Jr., MD    Enclosure  CC:  No Recipients    If you would like to receive this communication electronically, please contact [email or web address] or [phone] to request more information on [employer] access.    For providers and/or their staff who would like to refer a patient to [facility], please contact us through our one-stop-shop provider referral line, Olya Alexandra, at [phone].    If you feel you have received this communication in error or would no longer like to receive these types of communications, please e-mail [email or web address]          denies haleigh

## 2024-08-28 NOTE — BRIEF OPERATIVE NOTE - OPERATION/FINDINGS
Bronchoscopy, robotic left VATS, mediastinal lymph node biopsy, lymph node sent for frozen with intraoperative pathology positive for malignancy, proceeded with left upper lobectomy

## 2024-08-28 NOTE — CHART NOTE - NSCHARTNOTEFT_GEN_A_CORE
PACU ANESTHESIA ADMISSION NOTE      Procedure: L VATS left upper lobectomy  Post op diagnosis:  left lung nodule    ____  Intubated  TV:______       Rate: ______      FiO2: ______    __x__  Patent Airway    __x__  Full return of protective reflexes    __x__  Full recovery from anesthesia / back to baseline status    Vitals:  T(C): 36.6 (08-28-24 @ 07:25), Max: 36.6 (08-28-24 @ 06:21)  HR: 58 (08-28-24 @ 07:25) (58 - 58)  BP: 135/70 (08-28-24 @ 07:25) (135/70 - 135/70)  RR: 17 (08-28-24 @ 07:25) (17 - 17)  SpO2: 98% (08-28-24 @ 07:25) (98% - 98%)    Mental Status:  __x__ Awake   ___x__ Alert   _____ Drowsy   _____ Sedated    Nausea/Vomiting:  __x__ NO  ______Yes,   See Post - Op Orders          Pain Scale (0-10):  _____    Treatment: ____ None    __x__ See Post - Op/PCA Orders    Post - Operative Fluids:   ____ Oral   __x__ See Post - Op Orders    Plan: Discharge:   ____Home       _____Floor     __x___Critical Care    _____  Other:_________________    Comments: Patient had smooth intraoperative event, no anesthesia complication.  PACU Vital signs: HR:  60          BP:       128 / 62         RR:  16           O2 Sat:       96%     Temp 97.7

## 2024-08-29 LAB
ANION GAP SERPL CALC-SCNC: 9 MMOL/L — SIGNIFICANT CHANGE UP (ref 7–14)
BUN SERPL-MCNC: 16 MG/DL — SIGNIFICANT CHANGE UP (ref 10–20)
CALCIUM SERPL-MCNC: 8.7 MG/DL — SIGNIFICANT CHANGE UP (ref 8.4–10.5)
CHLORIDE SERPL-SCNC: 101 MMOL/L — SIGNIFICANT CHANGE UP (ref 98–110)
CO2 SERPL-SCNC: 24 MMOL/L — SIGNIFICANT CHANGE UP (ref 17–32)
CREAT SERPL-MCNC: 0.7 MG/DL — SIGNIFICANT CHANGE UP (ref 0.7–1.5)
EGFR: 86 ML/MIN/1.73M2 — SIGNIFICANT CHANGE UP
GLUCOSE SERPL-MCNC: 101 MG/DL — HIGH (ref 70–99)
HCT VFR BLD CALC: 38.7 % — SIGNIFICANT CHANGE UP (ref 37–47)
HGB BLD-MCNC: 12.9 G/DL — SIGNIFICANT CHANGE UP (ref 12–16)
MAGNESIUM SERPL-MCNC: 1.8 MG/DL — SIGNIFICANT CHANGE UP (ref 1.8–2.4)
MCHC RBC-ENTMCNC: 30.6 PG — SIGNIFICANT CHANGE UP (ref 27–31)
MCHC RBC-ENTMCNC: 33.3 G/DL — SIGNIFICANT CHANGE UP (ref 32–37)
MCV RBC AUTO: 91.7 FL — SIGNIFICANT CHANGE UP (ref 81–99)
NRBC # BLD: 0 /100 WBCS — SIGNIFICANT CHANGE UP (ref 0–0)
PHOSPHATE SERPL-MCNC: 3.9 MG/DL — SIGNIFICANT CHANGE UP (ref 2.1–4.9)
PLATELET # BLD AUTO: 211 K/UL — SIGNIFICANT CHANGE UP (ref 130–400)
PMV BLD: 10.4 FL — SIGNIFICANT CHANGE UP (ref 7.4–10.4)
POTASSIUM SERPL-MCNC: 4.5 MMOL/L — SIGNIFICANT CHANGE UP (ref 3.5–5)
POTASSIUM SERPL-SCNC: 4.5 MMOL/L — SIGNIFICANT CHANGE UP (ref 3.5–5)
RBC # BLD: 4.22 M/UL — SIGNIFICANT CHANGE UP (ref 4.2–5.4)
RBC # FLD: 14.6 % — HIGH (ref 11.5–14.5)
SODIUM SERPL-SCNC: 134 MMOL/L — LOW (ref 135–146)
WBC # BLD: 9.11 K/UL — SIGNIFICANT CHANGE UP (ref 4.8–10.8)
WBC # FLD AUTO: 9.11 K/UL — SIGNIFICANT CHANGE UP (ref 4.8–10.8)

## 2024-08-29 PROCEDURE — 71045 X-RAY EXAM CHEST 1 VIEW: CPT | Mod: 26

## 2024-08-29 PROCEDURE — 71045 X-RAY EXAM CHEST 1 VIEW: CPT | Mod: 26,77

## 2024-08-29 RX ADMIN — Medication 5000 UNIT(S): at 21:11

## 2024-08-29 RX ADMIN — Medication 40 MILLIGRAM(S): at 21:12

## 2024-08-29 RX ADMIN — LISINOPRIL 20 MILLIGRAM(S): 10 TABLET ORAL at 06:41

## 2024-08-29 RX ADMIN — Medication 2 TABLET(S): at 21:12

## 2024-08-29 RX ADMIN — POLYETHYLENE GLYCOL 3350 17 GRAM(S): 17 POWDER, FOR SOLUTION ORAL at 13:02

## 2024-08-29 RX ADMIN — Medication 5000 UNIT(S): at 13:58

## 2024-08-29 RX ADMIN — Medication 40 MILLIGRAM(S): at 13:01

## 2024-08-29 RX ADMIN — Medication 5000 UNIT(S): at 06:40

## 2024-08-29 RX ADMIN — Medication 10 MILLIGRAM(S): at 22:04

## 2024-08-29 RX ADMIN — CHLORHEXIDINE GLUCONATE 1 APPLICATION(S): 40 SOLUTION TOPICAL at 21:12

## 2024-08-29 RX ADMIN — Medication 10 MILLIGRAM(S): at 04:52

## 2024-08-29 RX ADMIN — CEFAZOLIN SODIUM 100 MILLIGRAM(S): 2 INJECTION, SOLUTION INTRAVENOUS at 01:28

## 2024-08-29 RX ADMIN — Medication 88 MICROGRAM(S): at 06:41

## 2024-08-29 RX ADMIN — KETOROLAC TROMETHAMINE 15 MILLIGRAM(S): 30 INJECTION, SOLUTION INTRAMUSCULAR at 14:00

## 2024-08-29 RX ADMIN — CEFAZOLIN SODIUM 100 MILLIGRAM(S): 2 INJECTION, SOLUTION INTRAVENOUS at 10:49

## 2024-08-29 NOTE — PROGRESS NOTE ADULT - ASSESSMENT
Ms. JAIRON HALL is a 83 year female, former smoker (quit 20 years ago), ECOG 0 with PMHx: HTN, HLD, Hypothyroidism S/P Thyroid cancer s/p thyroidectomy, tx with radioactive iodine is POD#1 s/p bronchoscopy, robotic L Vats, mediastinal lymph node bx, sent for frozen. OOBTC. Ambulated with PT, PT said no skilled needs needed. Intraop pathology positive for malignancy, RENY lobectomy. EBL 10. CT on ws, puting out sanguinous.    PLAN  - D/c Chest tube  - follow up CXR in 4 hrs after pulling chest tube  - monitor vitals  - monitor respiratory effort  - possible discharge to home today  - PT recs appreciated: no skilled needs needed

## 2024-08-29 NOTE — PHYSICAL THERAPY INITIAL EVALUATION ADULT - REHAB POTENTIAL, PT EVAL
Pt presents independent with functional mobility without AD. Pt does not require skilled b/s PT intervention and can be d/c from b/s PT./none

## 2024-08-29 NOTE — PATIENT PROFILE ADULT - FALL HARM RISK - HARM RISK INTERVENTIONS

## 2024-08-29 NOTE — PHYSICAL THERAPY INITIAL EVALUATION ADULT - ADDITIONAL COMMENTS
Pt lives alone in pvt home with flight of stairs indoors but can stay on first floor, reports niece will stay with her for a couple of days upon d/c, reports independent with functional mobility without AD prior to admission.

## 2024-08-29 NOTE — PHYSICAL THERAPY INITIAL EVALUATION ADULT - LEVEL OF INDEPENDENCE: STAIR NEGOTIATION, REHAB EVAL
+SOB, pt took ~2 min standing rest break at top of flight. SPO2 95% on RA taken upon return to room./independent

## 2024-08-29 NOTE — PHYSICAL THERAPY INITIAL EVALUATION ADULT - PERTINENT HX OF CURRENT PROBLEM, REHAB EVAL
ROS: no CP/SOB. no cough. no fever. + n/v/d. + abd pain. no rash. no bleeding. no urinary complaints. no weakness. no vision changes. no HA. no neck/back pain. no extremity swelling/deformity. No change in mental status. Pt presents for b/s PT IE s/p Lobectomy, lung, upper lobe, left, robot-assisted.

## 2024-08-30 ENCOUNTER — TRANSCRIPTION ENCOUNTER (OUTPATIENT)
Age: 84
End: 2024-08-30

## 2024-08-30 VITALS
SYSTOLIC BLOOD PRESSURE: 151 MMHG | DIASTOLIC BLOOD PRESSURE: 74 MMHG | OXYGEN SATURATION: 91 % | HEART RATE: 77 BPM | RESPIRATION RATE: 31 BRPM

## 2024-08-30 LAB
ANION GAP SERPL CALC-SCNC: 8 MMOL/L — SIGNIFICANT CHANGE UP (ref 7–14)
BUN SERPL-MCNC: 20 MG/DL — SIGNIFICANT CHANGE UP (ref 10–20)
CALCIUM SERPL-MCNC: 9 MG/DL — SIGNIFICANT CHANGE UP (ref 8.4–10.4)
CHLORIDE SERPL-SCNC: 98 MMOL/L — SIGNIFICANT CHANGE UP (ref 98–110)
CO2 SERPL-SCNC: 27 MMOL/L — SIGNIFICANT CHANGE UP (ref 17–32)
CREAT SERPL-MCNC: 0.7 MG/DL — SIGNIFICANT CHANGE UP (ref 0.7–1.5)
EGFR: 86 ML/MIN/1.73M2 — SIGNIFICANT CHANGE UP
GLUCOSE SERPL-MCNC: 111 MG/DL — HIGH (ref 70–99)
HCT VFR BLD CALC: 41.4 % — SIGNIFICANT CHANGE UP (ref 37–47)
HGB BLD-MCNC: 13.5 G/DL — SIGNIFICANT CHANGE UP (ref 12–16)
MAGNESIUM SERPL-MCNC: 1.8 MG/DL — SIGNIFICANT CHANGE UP (ref 1.8–2.4)
MCHC RBC-ENTMCNC: 30.3 PG — SIGNIFICANT CHANGE UP (ref 27–31)
MCHC RBC-ENTMCNC: 32.6 G/DL — SIGNIFICANT CHANGE UP (ref 32–37)
MCV RBC AUTO: 92.8 FL — SIGNIFICANT CHANGE UP (ref 81–99)
NRBC # BLD: 0 /100 WBCS — SIGNIFICANT CHANGE UP (ref 0–0)
PLATELET # BLD AUTO: 234 K/UL — SIGNIFICANT CHANGE UP (ref 130–400)
PMV BLD: 10.2 FL — SIGNIFICANT CHANGE UP (ref 7.4–10.4)
POTASSIUM SERPL-MCNC: 5 MMOL/L — SIGNIFICANT CHANGE UP (ref 3.5–5)
POTASSIUM SERPL-SCNC: 5 MMOL/L — SIGNIFICANT CHANGE UP (ref 3.5–5)
RBC # BLD: 4.46 M/UL — SIGNIFICANT CHANGE UP (ref 4.2–5.4)
RBC # FLD: 14.9 % — HIGH (ref 11.5–14.5)
SODIUM SERPL-SCNC: 133 MMOL/L — LOW (ref 135–146)
WBC # BLD: 8.43 K/UL — SIGNIFICANT CHANGE UP (ref 4.8–10.8)
WBC # FLD AUTO: 8.43 K/UL — SIGNIFICANT CHANGE UP (ref 4.8–10.8)

## 2024-08-30 PROCEDURE — 71045 X-RAY EXAM CHEST 1 VIEW: CPT | Mod: 26

## 2024-08-30 PROCEDURE — 71045 X-RAY EXAM CHEST 1 VIEW: CPT | Mod: 26,77

## 2024-08-30 RX ORDER — ACETAMINOPHEN 325 MG/1
2 TABLET ORAL
Qty: 0 | Refills: 0 | DISCHARGE

## 2024-08-30 RX ADMIN — Medication 25 GRAM(S): at 11:17

## 2024-08-30 RX ADMIN — Medication 88 MICROGRAM(S): at 05:51

## 2024-08-30 RX ADMIN — LISINOPRIL 20 MILLIGRAM(S): 10 TABLET ORAL at 05:51

## 2024-08-30 RX ADMIN — Medication 5000 UNIT(S): at 05:51

## 2024-08-30 NOTE — PROGRESS NOTE ADULT - ASSESSMENT
83 year female, former smoker (quit 20 years ago), ECOG 0 with PMHx: HTN, HLD, Hypothyroidism S/P Thyroid cancer s/p thyroidectomy, tx with radioactive iodine is POD#1 s/p bronchoscopy, robotic L Vats, mediastinal lymph node bx, sent for frozen. OOBTC. Ambulated with PT, PT said no skilled needs needed. Intraop pathology positive for malignancy, RENY lobectomy. EBL 10. CT on ws, +AL,  putting out serosanguinous.    PLAN  - D/c Chest tube if no air leak through the day and improved AM CXR  - follow up CXR in 2-4 hrs after pulling chest tube  - monitor vitals  - monitor respiratory effort  - possible discharge to home today  - PT recs appreciated: no skilled needs needed    83 year female, former smoker (quit 20 years ago), ECOG 0 with PMHx: HTN, HLD, Hypothyroidism S/P Thyroid cancer s/p thyroidectomy, tx with radioactive iodine is POD#2 s/p bronchoscopy, robotic L Vats, mediastinal lymph node bx, sent for frozen. OOBTC. Intraop pathology positive for malignancy, RENY lobectomy. EBL 10. CT on ws, +AL,  putting out serosanguinous.    PLAN  - D/c Chest tube if no air leak through the day and improved AM CXR  - follow up CXR in 2-4 hrs after pulling chest tube  - monitor vitals  - monitor respiratory effort  - possible discharge to home today  - PT recs appreciated: no skilled needs needed

## 2024-08-30 NOTE — PROGRESS NOTE ADULT - SUBJECTIVE AND OBJECTIVE BOX
THORACIC SURGERY PROGRESS NOTE     JAIRON HALL  [address] [place] [address] day :1d    POD:  Procedure: Lobectomy, lung, upper lobe, left, robot-assisted    Bronchoscopy      Surgical Attending: Mak Worley  24hr  events: POD#1 s/p bronchoscopy, robotic L Vats, mediastinal lymph node bx, sent for frozen. OOBTC. Ambulated with PT, PT said no skilled needs needed. Intraop pathology positive for malignancy, RENY lobectomy. EBL 10. CT on ws, puting out sanguinous.    PHYSICAL EXAM:  GENERAL: NAD, well-appearing  CHEST/LUNG: b/l equal chest rise. L CT on WS, -AL, putting out sanguinous fluid  HEART: Regular rate and rhythm  ABDOMEN: Soft, Nontender, Nondistended;   EXTREMITIES:  No clubbing, cyanosis, or edema      T(F): 97.7 (08-29-24 @ 04:00), Max: 98.1 (08-28-24 @ 17:00)  HR: 66 (08-29-24 @ 07:00) (55 - 79)  BP: 130/71 (08-29-24 @ 07:00) (110/69 - 174/81)  ABP: --  ABP(mean): --  RR: 35 (08-29-24 @ 07:00) (15 - 36)  SpO2: 97% (08-29-24 @ 07:00) (93% - 100%)    IN'S / OUT's:    08-28-24 @ 07:01  -  08-29-24 @ 07:00  --------------------------------------------------------  IN:    IV PiggyBack: 50 mL    Oral Fluid: 240 mL    sodium chloride 0.9%: 180 mL  Total IN: 470 mL    OUT:    Chest Tube (mL): 320 mL    Voided (mL): 800 mL  Total OUT: 1120 mL    Total NET: -650 mL          MEDICATIONS  (STANDING):  atorvastatin 40 milliGRAM(s) Oral at bedtime  ceFAZolin   IVPB 1000 milliGRAM(s) IV Intermittent every 8 hours  chlorhexidine 2% Cloths 1 Application(s) Topical daily  heparin   Injectable 5000 Unit(s) SubCutaneous every 8 hours  HYDROmorphone PCA (1 mG/mL) 30 milliLiter(s) PCA Continuous PCA Continuous  levothyroxine 88 MICROGram(s) Oral daily  lisinopril 20 milliGRAM(s) Oral daily  pantoprazole    Tablet 40 milliGRAM(s) Oral daily  polyethylene glycol 3350 17 Gram(s) Oral daily  senna 2 Tablet(s) Oral at bedtime  sodium chloride 0.9%. 1000 milliLiter(s) (10 mL/Hr) IV Continuous <Continuous>    MEDICATIONS  (PRN):  hydrALAZINE Injectable 10 milliGRAM(s) IV Push every 4 hours PRN SBP >/= 145 mmHg  ketorolac   Injectable 15 milliGRAM(s) IV Push every 8 hours PRN Moderate Pain (4 - 6)  naloxone Injectable 0.1 milliGRAM(s) IV Push every 3 minutes PRN For ANY of the following changes in patient status:  A. RR LESS THAN 10 breaths per minute, B. Oxygen saturation LESS THAN 90%, C. Sedation score of 6  ondansetron Injectable 4 milliGRAM(s) IV Push every 6 hours PRN Nausea      LABS  Labs:  CAPILLARY BLOOD GLUCOSE                              12.9   9.11  )-----------( 211      ( 29 Aug 2024 03:53 )             38.7         08-29    134<L>  |  101  |  16  ----------------------------<  101<H>  4.5   |  24  |  0.7      Phosphorus: 3.9 mg/dL (08-29-24 @ 03:53)      LFTs:     Blood Gas Arterial, Lactate: 0.8 mmol/L (08-28-24 @ 12:22)    ABG - ( 28 Aug 2024 12:22 )  pH: 7.36  /  pCO2: 43    /  pO2: 69    / HCO3: 24    / Base Excess: -1.3  /  SaO2: 95.1              Coags:            Urinalysis Basic - ( 29 Aug 2024 03:53 )    Color: x / Appearance: x / SG: x / pH: x  Gluc: 101 mg/dL / Ketone: x  / Bili: x / Urobili: x   Blood: x / Protein: x / Nitrite: x   Leuk Esterase: x / RBC: x / WBC x   Sq Epi: x / Non Sq Epi: x / Bacteria: x            RADIOLOGY & ADDITIONAL TESTS:  
THORACIC SURGERY PROGRESS NOTE     JAIRON HALL  [address] [place] [address] day :2d    POD: 2d  Procedure: Lobectomy, lung, upper lobe, left, robot-assisted    Bronchoscopy      Surgical Attending: Mak Worley  24hr events: Pt: no skilled needs. L CT on WS, +AL when coughing, 210 cc serosanguinous output over 24hrs. On 2LNC satting 98%. No acute events overnight. CXR AM ordered    PHYSICAL EXAM:  GENERAL: NAD, well-appearing  CHEST/LUNG: b/l equal chest rise. L CT on WS, +AL, putting out sanguinous fluid  HEART: Regular rate and rhythm  ABDOMEN: Soft, Nontender, Nondistended;   EXTREMITIES:  No clubbing, cyanosis, or edema      T(F): 97.9 (08-30-24 @ 04:00), Max: 98.5 (08-29-24 @ 20:00)  HR: 91 (08-30-24 @ 06:00) (64 - 104)  BP: 143/64 (08-30-24 @ 06:00) (97/52 - 154/66)  ABP: --  ABP(mean): --  RR: 37 (08-30-24 @ 06:00) (16 - 40)  SpO2: 92% (08-30-24 @ 06:00) (89% - 98%)    IN'S / OUT's:    08-29-24 @ 07:01  -  08-30-24 @ 07:00  --------------------------------------------------------  IN:    IV PiggyBack: 50 mL    sodium chloride 0.9%: 120 mL  Total IN: 170 mL    OUT:    Chest Tube (mL): 230 mL    Voided (mL): 1250 mL  Total OUT: 1480 mL    Total NET: -1310 mL          MEDICATIONS  (STANDING):  atorvastatin 40 milliGRAM(s) Oral at bedtime  bisacodyl Suppository 10 milliGRAM(s) Rectal once  chlorhexidine 2% Cloths 1 Application(s) Topical daily  heparin   Injectable 5000 Unit(s) SubCutaneous every 8 hours  HYDROmorphone PCA (1 mG/mL) 30 milliLiter(s) PCA Continuous PCA Continuous  levothyroxine 88 MICROGram(s) Oral daily  lisinopril 20 milliGRAM(s) Oral daily  pantoprazole    Tablet 40 milliGRAM(s) Oral daily  polyethylene glycol 3350 17 Gram(s) Oral daily  senna 2 Tablet(s) Oral at bedtime  sodium chloride 0.9%. 1000 milliLiter(s) (10 mL/Hr) IV Continuous <Continuous>    MEDICATIONS  (PRN):  hydrALAZINE Injectable 10 milliGRAM(s) IV Push every 4 hours PRN SBP >/= 145 mmHg  ketorolac   Injectable 15 milliGRAM(s) IV Push every 8 hours PRN Moderate Pain (4 - 6)  naloxone Injectable 0.1 milliGRAM(s) IV Push every 3 minutes PRN For ANY of the following changes in patient status:  A. RR LESS THAN 10 breaths per minute, B. Oxygen saturation LESS THAN 90%, C. Sedation score of 6  ondansetron Injectable 4 milliGRAM(s) IV Push every 6 hours PRN Nausea      LABS  Labs:  CAPILLARY BLOOD GLUCOSE                              13.5   8.43  )-----------( 234      ( 30 Aug 2024 04:10 )             41.4         08-30    133<L>  |  98  |  20  ----------------------------<  111<H>  5.0   |  27  |  0.7      Calcium: 9.0 mg/dL (08-30-24 @ 04:10)      LFTs:     Blood Gas Arterial, Lactate: 0.8 mmol/L (08-28-24 @ 12:22)    ABG - ( 28 Aug 2024 12:22 )  pH: 7.36  /  pCO2: 43    /  pO2: 69    / HCO3: 24    / Base Excess: -1.3  /  SaO2: 95.1              Coags:            Urinalysis Basic - ( 30 Aug 2024 04:10 )    Color: x / Appearance: x / SG: x / pH: x  Gluc: 111 mg/dL / Ketone: x  / Bili: x / Urobili: x   Blood: x / Protein: x / Nitrite: x   Leuk Esterase: x / RBC: x / WBC x   Sq Epi: x / Non Sq Epi: x / Bacteria: x            RADIOLOGY & ADDITIONAL TESTS:

## 2024-08-30 NOTE — DISCHARGE NOTE NURSING/CASE MANAGEMENT/SOCIAL WORK - PATIENT PORTAL LINK FT
You can access the FollowMyHealth Patient Portal offered by [facility] by registering at the following website: [email or web address]. By joining [facility]’s FollowMyHealth portal, you will also be able to view your health information using other applications (apps) compatible with our system.

## 2024-09-05 DIAGNOSIS — C34.12 MALIGNANT NEOPLASM OF UPPER LOBE, LEFT BRONCHUS OR LUNG: ICD-10-CM

## 2024-09-05 DIAGNOSIS — Z87.891 PERSONAL HISTORY OF NICOTINE DEPENDENCE: ICD-10-CM

## 2024-09-05 DIAGNOSIS — I10 ESSENTIAL (PRIMARY) HYPERTENSION: ICD-10-CM

## 2024-09-05 DIAGNOSIS — E89.0 POSTPROCEDURAL HYPOTHYROIDISM: ICD-10-CM

## 2024-09-05 DIAGNOSIS — E78.5 HYPERLIPIDEMIA, UNSPECIFIED: ICD-10-CM

## 2024-09-05 DIAGNOSIS — Z85.850 PERSONAL HISTORY OF MALIGNANT NEOPLASM OF THYROID: ICD-10-CM

## 2024-09-10 ENCOUNTER — APPOINTMENT (OUTPATIENT)
Dept: CARDIOTHORACIC SURGERY | Facility: CLINIC | Age: 84
End: 2024-09-10
Payer: MEDICARE

## 2024-09-10 ENCOUNTER — OUTPATIENT (OUTPATIENT)
Dept: OUTPATIENT SERVICES | Facility: HOSPITAL | Age: 84
LOS: 1 days | End: 2024-09-10
Payer: MEDICARE

## 2024-09-10 VITALS
HEART RATE: 64 BPM | WEIGHT: 166 LBS | HEIGHT: 60 IN | RESPIRATION RATE: 14 BRPM | OXYGEN SATURATION: 94 % | TEMPERATURE: 98.9 F | DIASTOLIC BLOOD PRESSURE: 73 MMHG | SYSTOLIC BLOOD PRESSURE: 128 MMHG | BODY MASS INDEX: 32.59 KG/M2

## 2024-09-10 DIAGNOSIS — R06.02 SHORTNESS OF BREATH: ICD-10-CM

## 2024-09-10 DIAGNOSIS — Z98.890 OTHER SPECIFIED POSTPROCEDURAL STATES: Chronic | ICD-10-CM

## 2024-09-10 PROBLEM — R91.1 SOLITARY PULMONARY NODULE: Chronic | Status: ACTIVE | Noted: 2024-08-21

## 2024-09-10 PROCEDURE — 99024 POSTOP FOLLOW-UP VISIT: CPT

## 2024-09-10 PROCEDURE — 71046 X-RAY EXAM CHEST 2 VIEWS: CPT | Mod: 26

## 2024-09-10 PROCEDURE — 71046 X-RAY EXAM CHEST 2 VIEWS: CPT

## 2024-09-11 DIAGNOSIS — R06.02 SHORTNESS OF BREATH: ICD-10-CM

## 2024-09-12 NOTE — COUNSELING
[Hygeine (Including Daily Shower)] : hygeine (including daily shower) [Importance of Regular Medical Follow-Up] : the importance of regular medical follow-up [No Heavy Lifting] : no heavy lifting (>15-20 lb. for 1 month or 25 lb. for 3 months from date of surgery) [Blood Pressure Control] : blood pressure control [S/S of infection] : signs and symptoms of infection (and to whom it should be reported) [Progressive Ambulation/Activity] : progressive ambulation/activity [Medication/Vitamin/Herb/Food Interaction] : medication/vitamin/herb/food interaction [FreeTextEntry1] :  Ms. JAIRON HALL 83 year F   , S/P Lung Resection.  Incisions healing well and all sutures were removed. Incision site care was reviewed. No lotions, perfumes to the incision site. On arrival patient denies fever, chills nausea, and vomiting. Denies SOB or palpitation. All questions and concerns were addressed.  Medications were reviewed with the patient. Pain needs addressed.  Advised no flying or lifting anything >10lbs for at least 6 weeks post thoracic surgery.

## 2024-09-12 NOTE — PHYSICAL EXAM
[] : no respiratory distress [Auscultation Breath Sounds / Voice Sounds] : lungs were clear to auscultation bilaterally [Heart Rate And Rhythm] : heart rate was normal and rhythm regular [Heart Sounds] : normal S1 and S2 [Heart Sounds Gallop] : no gallops [Murmurs] : no murmurs [Heart Sounds Pericardial Friction Rub] : no pericardial rub [Clean] : clean [Dry] : dry [Healing Well] : healing well [No Edema] : no edema [FreeTextEntry1] : Left Mid-Axillary (VATS)

## 2024-09-12 NOTE — ASSESSMENT
[FreeTextEntry1] : Ms. JAIRON HALL is a 83 year female, former smoker (quit 20 years ago), ECOG 0 with PMHx: HTN, HLD, Hypothyroidism S/P Thyroid cancer s/p thyroidectomy, tx with radioactive iodine. Patient referred for a consultation for a SYLVIA 2.8 X 2.3 cm nodule with multiple soft tissue bilateral lung nodules. Patient was seen by their PMD for work up for a cough and incidental finding on xray, she was treated with abx for a SYLVIA opacity which yielded no improvement on lesion Patient then underwent CT chest which revealed a spiculated nodule in the SYLVIA, subsequently patient had a PET CT. Patient presented on 8/28/24 for robotic assisted left VATs. Intra-operative pathology of mediastinal lymph node returned positive for malignancy and patient underwent left upper lobe lobectomy. Post-operatively, patient had an uneventful hospital course. Her chest tube was removed on POD#2 without complication. Patient was discharged home in stable condition on POD#2 with instructions to follow up with Dr. Nacho Worley on 9/10/2024   Patient presents to office with her nephew. Chest Xray obtained prior to office visit. Images reviewed and discussed with patient. Final Path not resulted at this time. Patient overall feels well, denies any fever, chills States she does have some SOB, which resolves with rest. Endorses she is not feeling any pain and therefore has not needed any medication.   -Plan Pending Pathology results Will present in Oncological Tumor Board F/U CTS via televisit for results and plan  Will notify nephew flying to [place] with results as well, per patient request. I, Mak Worley saw, examined and reviewed the diagnostic images on patient:  JAIRON HALL on 09/10/2024 and agreed with my Nurse Practitioner's clinical note, physical exam findings and treatment plan. Patient presented for postoperative follow-up.  She underwent robotic assisted left thoracoscopy, left upper lobectomy and mediastinal lymph node dissection.  Procedure date 8/28/2024.  Procedure without complications.  Patient was discharged postoperative day 2.  Today in the office, patient is doing well, mild surgical site pain and mild shortness of breath.  Chest x-ray with no abnormal finding, expected postoperative changes.  Pathology report is pending.  Surgical wounds are healing fine.  Activity recommendations given.  Plan to follow-up televisit 1 or 2 weeks to discuss the pathology report and determine need for additional treatment.

## 2024-09-12 NOTE — REASON FOR VISIT
[Family Member] : family member [de-identified] : 8/28/2024 [de-identified] : S/P robotic assisted left VATs. with Intra-operative pathology of mediastinal lymph node returned positive for malignancy and patient underwent left upper lobe lobectomy

## 2024-09-12 NOTE — REASON FOR VISIT
[Family Member] : family member [de-identified] : 8/28/2024 [de-identified] : S/P robotic assisted left VATs. with Intra-operative pathology of mediastinal lymph node returned positive for malignancy and patient underwent left upper lobe lobectomy

## 2024-09-12 NOTE — REASON FOR VISIT
[Family Member] : family member [de-identified] : 8/28/2024 [de-identified] : S/P robotic assisted left VATs. with Intra-operative pathology of mediastinal lymph node returned positive for malignancy and patient underwent left upper lobe lobectomy

## 2024-09-12 NOTE — REASON FOR VISIT
[Family Member] : family member [de-identified] : 8/28/2024 [de-identified] : S/P robotic assisted left VATs. with Intra-operative pathology of mediastinal lymph node returned positive for malignancy and patient underwent left upper lobe lobectomy

## 2024-09-17 LAB — SURGICAL PATHOLOGY STUDY: SIGNIFICANT CHANGE UP

## 2024-09-19 ENCOUNTER — APPOINTMENT (OUTPATIENT)
Dept: CARDIOTHORACIC SURGERY | Facility: CLINIC | Age: 84
End: 2024-09-19
Payer: MEDICARE

## 2024-09-19 ENCOUNTER — NON-APPOINTMENT (OUTPATIENT)
Age: 84
End: 2024-09-19

## 2024-09-19 DIAGNOSIS — R91.8 OTHER NONSPECIFIC ABNORMAL FINDING OF LUNG FIELD: ICD-10-CM

## 2024-09-19 DIAGNOSIS — Z09 ENCOUNTER FOR FOLLOW-UP EXAMINATION AFTER COMPLETED TREATMENT FOR CONDITIONS OTHER THAN MALIGNANT NEOPLASM: ICD-10-CM

## 2024-09-19 PROCEDURE — 99024 POSTOP FOLLOW-UP VISIT: CPT

## 2024-09-20 NOTE — ASSESSMENT
[FreeTextEntry1] : Ms. JAIRON HALL is a 83 year female, former smoker (quit 20 years ago), ECOG 0 with PMHx: HTN, HLD, Hypothyroidism S/P Thyroid cancer s/p thyroidectomy, tx with radioactive iodine. Patient referred for a consultation for a SYLVIA 2.8 X 2.3 cm nodule with multiple soft tissue bilateral lung nodules. Patient was seen by their PMD for work up for a cough and incidental finding on xray, she was treated with abx for a SYLVIA opacity which yielded no improvement on lesion Patient then underwent CT chest which revealed a spiculated nodule in the SYLVIA, subsequently patient had a PET CT. Patient presented on 8/28/24 for robotic assisted left VATs. Intra-operative pathology of mediastinal lymph node returned positive for malignancy and patient underwent left upper lobe lobectomy. Post-operatively, patient had an uneventful hospital course. Her chest tube was removed on POD#2 without complication. 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 Mane and favorable to seeking consultation for Chemotherapy CT Chest in 4 months F/U CTS for review IMak reviewed the diagnostic images on patient:  JAIRON HALL on 09/19/2024 and agreed with my Nurse Practitioner's clinical note and treatment plan.

## 2024-09-20 NOTE — REASON FOR VISIT
[Home] : at home, [unfilled] , at the time of the visit. [Medical Office: ([facility])___] : at the medical office located in  [Verbal consent obtained from patient] : the patient, [unfilled] [de-identified] : 8/28/2024 [de-identified] : S/P robotic assisted left VATs. with Intra-operative pathology of mediastinal lymph node returned positive for malignancy and patient underwent left upper lobe lobectomy. Final Path pT1cN1 Invasive Adenocarcinoma (Stg IIB)

## 2024-09-23 ENCOUNTER — OUTPATIENT (OUTPATIENT)
Dept: OUTPATIENT SERVICES | Facility: HOSPITAL | Age: 84
LOS: 1 days | End: 2024-09-23
Payer: MEDICARE

## 2024-09-23 ENCOUNTER — APPOINTMENT (OUTPATIENT)
Age: 84
End: 2024-09-23
Payer: MEDICARE

## 2024-09-23 ENCOUNTER — NON-APPOINTMENT (OUTPATIENT)
Age: 84
End: 2024-09-23

## 2024-09-23 VITALS
DIASTOLIC BLOOD PRESSURE: 71 MMHG | RESPIRATION RATE: 14 BRPM | HEART RATE: 80 BPM | OXYGEN SATURATION: 100 % | SYSTOLIC BLOOD PRESSURE: 169 MMHG | HEIGHT: 60 IN | BODY MASS INDEX: 32.79 KG/M2 | WEIGHT: 167 LBS | TEMPERATURE: 97.2 F

## 2024-09-23 DIAGNOSIS — R91.8 OTHER NONSPECIFIC ABNORMAL FINDING OF LUNG FIELD: ICD-10-CM

## 2024-09-23 DIAGNOSIS — Z98.890 OTHER SPECIFIED POSTPROCEDURAL STATES: Chronic | ICD-10-CM

## 2024-09-23 DIAGNOSIS — C34.90 MALIGNANT NEOPLASM OF UNSPECIFIED PART OF UNSPECIFIED BRONCHUS OR LUNG: ICD-10-CM

## 2024-09-23 PROCEDURE — 99205 OFFICE O/P NEW HI 60 MIN: CPT

## 2024-09-23 PROCEDURE — G2211 COMPLEX E/M VISIT ADD ON: CPT

## 2024-09-24 DIAGNOSIS — R91.8 OTHER NONSPECIFIC ABNORMAL FINDING OF LUNG FIELD: ICD-10-CM

## 2024-09-29 ENCOUNTER — OUTPATIENT (OUTPATIENT)
Dept: OUTPATIENT SERVICES | Facility: HOSPITAL | Age: 84
LOS: 1 days | End: 2024-09-29
Payer: MEDICARE

## 2024-09-29 DIAGNOSIS — Z98.890 OTHER SPECIFIED POSTPROCEDURAL STATES: Chronic | ICD-10-CM

## 2024-09-29 DIAGNOSIS — Z00.8 ENCOUNTER FOR OTHER GENERAL EXAMINATION: ICD-10-CM

## 2024-09-29 DIAGNOSIS — C34.90 MALIGNANT NEOPLASM OF UNSPECIFIED PART OF UNSPECIFIED BRONCHUS OR LUNG: ICD-10-CM

## 2024-09-29 PROCEDURE — 70552 MRI BRAIN STEM W/DYE: CPT | Mod: 26

## 2024-09-29 PROCEDURE — 70552 MRI BRAIN STEM W/DYE: CPT

## 2024-09-29 NOTE — END OF VISIT
[] : Fellow [FreeTextEntry3] : This is a 83-year-old female who presented for initial valuation for stage IIb lung cancer she was found to have N1 disease  PDL1 <1%. EGFR mutation negative.  We had an extensive discussion regarding adjuvant treatment of lung cancer and I recommended that we obtain further NGS in order to see if she has an ALK mutation.  If this comes back negative then I recommended she considers adjuvant chemotherapy with carboplatin and Alimta for 4 cycles with the absolute benefit of approximately 5%.  We went over side effects as well we also recommended an MRI of the brain to complete staging and she will see us back in 2 weeks and we will proceed from there [Time Spent: ___ minutes] : I have spent [unfilled] minutes of time on the encounter which excludes teaching and separately reported services.

## 2024-09-29 NOTE — ASSESSMENT
[FreeTextEntry1] : 83-year-old female is here for recently diagnosed Left Lung cancer s/p Lobectomy.  Disease: Adenocarcinoma of Left Lung.  Stage IIB [id number]. PDL1 <1%. EGFR mutation negative.  We discussed the histology and radiology findings with the patient and discussed the natural course of the disease. It was explained that pt has Stage IIB Lung cancer. The treatment options will depend upon NGS results.  If NGS shows ALK mutations we will offer Alectinib. For patients with resected, anaplastic lymphoma kinase (ALK)-positive NSCLC that is at least 4 cm or involving the lymph nodes, adjuvant alectinib has regulatory approval by the FDA. If NGS comes back negative for ALK mutation treatment would be with chemotherapy with carboplatin and pemetrexed once every 3 weeks for 4 cycles. will reduce the dose of chemotherapy given her age. we informed that the benefit of chemotherapy in addition to surgery for early stage Lung cancers  is around 5 %.  we briefly went over the side effects of chemotherapy  Side effect profile of carboplatin and pemetrexed were discussed with pt including but not limited to: gastrointestinal pain, nausea and vomiting, anemia, leukopenia, neutropenia, thrombocytopenia, increased serum alkaline phosphatase, increased serum aspartate aminotransferase, decreased creatinine clearance, increased blood urea nitrogen, alopecia, constipation, diarrhea, dysgeusia, stomatitis, hypersensitivity reaction, infection, neurotoxicity, peripheral neuropathy, ototoxicity and electrolyte disturbances.  Plan will send NGS to check for ALK mutations. she will return in 2 weeks with NGS results and will finalize the plan. will order an MRI Brain with contrast. RTC 2 weeks.  All her questions were answered.

## 2024-09-29 NOTE — ASSESSMENT
[FreeTextEntry1] : 83-year-old female is here for recently diagnosed Left Lung cancer s/p Lobectomy.  Disease: Adenocarcinoma of Left Lung.  Stage IIB [id number]. PDL1 <1%. EGFR mutation negative.  We discussed the histology and radiology findings with the patient and discussed the natural course of the disease. It was explained that pt has Stage IIB Lung cancer. The treatment options will depend upon NGS results.  If NGS shows ALK mutations we will offer Alectinib. For patients with resected, anaplastic lymphoma kinase (ALK)-positive NSCLC that is at least 4 cm or involving the lymph nodes, adjuvant alectinib has regulatory approval by the FDA. If NGS comes back negative for ALK mutation treatment would be with chemotherapy with carboplatin and pemetrexed once every 3 weeks for 4 cycles. will reduce the dose of chemotherapy given her age. we informed that the benefit of chemotherapy in addition to surgery for early stage Lung cancers  is around 5 %.  we briefly went over the side effects of chemotherapy  Side effect profile of carboplatin and pemetrexed were discussed with pt including but not limited to: gastrointestinal pain, nausea and vomiting, anemia, leukopenia, neutropenia, thrombocytopenia, increased serum alkaline phosphatase, increased serum aspartate aminotransferase, decreased creatinine clearance, increased blood urea nitrogen, alopecia, constipation, diarrhea, dysgeusia, stomatitis, hypersensitivity reaction, infection, neurotoxicity, peripheral neuropathy, ototoxicity and electrolyte disturbances.  Plan will send NGS to check for ALK mutations. she will return in 2 weeks with NGS results and will finalize the plan. will order an MRI Brain with contrast. RTC 2 weeks.  All her questions were answered.

## 2024-09-29 NOTE — ASSESSMENT
[FreeTextEntry1] : 83-year-old female is here for recently diagnosed Left Lung cancer s/p Lobectomy.  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 PDL1 <1%. EGFR mutation negative.  We discussed the histology and radiology findings with the patient and discussed the natural course of the disease. It was explained that pt has Stage IIB Lung cancer. The treatment options will depend upon NGS results.  If NGS shows ALK mutations we will offer Alectinib. For patients with resected, anaplastic lymphoma kinase (ALK)-positive NSCLC that is at least 4 cm or involving the lymph nodes, adjuvant alectinib has regulatory approval by the FDA. If NGS comes back negative for ALK mutation treatment would be with chemotherapy with carboplatin and pemetrexed once every 3 weeks for 4 cycles. will reduce the dose of chemotherapy given her age. we informed that the benefit of chemotherapy in addition to surgery for early stage Lung cancers  is around 5 %.  we briefly went over the side effects of chemotherapy  Side effect profile of carboplatin and pemetrexed were discussed with pt including but not limited to: gastrointestinal pain, nausea and vomiting, anemia, leukopenia, neutropenia, thrombocytopenia, increased serum alkaline phosphatase, increased serum aspartate aminotransferase, decreased creatinine clearance, increased blood urea nitrogen, alopecia, constipation, diarrhea, dysgeusia, stomatitis, hypersensitivity reaction, infection, neurotoxicity, peripheral neuropathy, ototoxicity and electrolyte disturbances.  Plan will send NGS to check for ALK mutations. she will return in 2 weeks with NGS results and will finalize the plan. will order an MRI Brain with contrast. RTC 2 weeks.  All her questions were answered.

## 2024-09-29 NOTE — HISTORY OF PRESENT ILLNESS
[de-identified] : 83-year-old female is here for recently diagnosed Lung cancer.  83 years old had  intermittent cough and cold saw her PMD had a chest x-ray done which showed left upper lobe opacity was given antibiotics repeat chest x-ray done few weeks later unchanged opacity underwent CT chest on August 1 which showed left upper lobe spiculated nodule and multiple bilateral nodular right lower lobe and left upper lobe underwent PET scan showed uptake in the left upper lobe nodule highly suspicious for malignancy last chest x-ray was 2008.  on 8/28/2024 she is S/P robotic assisted left VATs. with Intra-operative pathology of mediastinal lymph node returned positive for malignancy and patient underwent left upper lobe lobectomy. Final pathology was consistent with Stage IIB [id number] Adenocarcinoma of Left Lung.  EGFR mutation negative. She recovered well after the surgery. Postoperative period is uneventful. she is here to discuss adjuvant therapy.   Detailed pathology is as follows. synoptic Summary 13: Lung - Resection Specimen Procedure:  Lobectomy Specimen Laterality:   Left Tumor Tumor Focality:   Single focus Tumor Site:  Upper lobe of lung Tumor Size Total Tumor Size:   2.5 Centimeters (cm) Histologic Type:   Adenocarcinoma with complex cribriform and fused glands Histologic Patterns Present:   Acinar - 15%; Micropapillary -5%;  Complex glands (cribriform and fused glands) - 80% Histologic Grade:   G3, poorly differentiated Spread Through Air Spaces (JARRETT):    Not identified Visceral Pleura Invasion:   Cannot be determined - Please see Note 1 in the main diagnosis Direct Invasion of Adjacent Structures:    Not identified Treatment Effect:   No known presurgical therapy Lymphovascular Invasion:   Lymphatic invasion present Margins Margin Status for Invasive Carcinoma:    All margins negative for invasive carcinoma Closest Margin(s) to Invasive Carcinoma:    Parenchymal Distance from Invasive Carcinoma to Closest Margin:    1.6 cm Margin Status for Non-Invasive Tumor:    Not applicable Regional Lymph Nodes Lymph Node(s) from Prior Procedures:    No known prior lymph node sampling performed Regional Lymph Node Status:   Tumor present in regional lymph node(s) Number of Lymph Nodes with Tumor:    2 Jj Site(s) with Tumor:   Left - Left level 11 lymph node and left level 12 lymph node near pulmonary artery Extranodal Extension:   Not identified Number of Lymph Nodes Examined:   15 Jj Site(s) Examined:   5: Subaortic / aortopulmonary (AP) / AP window;  8L: Para-esophageal;  9L: Pulmonary ligament; 10L: Hilar;  11L: Interlobar;  12L: Lobar;  13L: Segmental; Left - Left level 7, left level 7 near vagus, left level 10 lymph node near pulmonary artery, left level 12 lymph node near pulmonary artery, left level 10 lymph node near pulmonary vein, Pathologic Stage Classification (pTNM, AJCC 8th Edition) pT Category:   pT1c pN Category:   pN1 Additional Findings Additional Findings:   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   Pmhx : HTN, HLD Meds: Synthroid, Atorvastatin, Lisinopril. Allergies: None. Surgical Hx: Hysterectomy. Family Hx: None. Social Hx : quit Smoker 15 years ago smoked for 40 years 2 packs a day. Drinks alcohol occasionally.

## 2024-09-29 NOTE — HISTORY OF PRESENT ILLNESS
[de-identified] : 83-year-old female is here for recently diagnosed Lung cancer.  83 years old had  intermittent cough and cold saw her PMD had a chest x-ray done which showed left upper lobe opacity was given antibiotics repeat chest x-ray done few weeks later unchanged opacity underwent CT chest on August 1 which showed left upper lobe spiculated nodule and multiple bilateral nodular right lower lobe and left upper lobe underwent PET scan showed uptake in the left upper lobe nodule highly suspicious for malignancy last chest x-ray was 2008.  on 8/28/2024 she is S/P robotic assisted left VATs. with Intra-operative pathology of mediastinal lymph node returned positive for malignancy and patient underwent left upper lobe lobectomy. Final pathology was consistent with Stage IIB [id number] Adenocarcinoma of Left Lung.  EGFR mutation negative. She recovered well after the surgery. Postoperative period is uneventful. she is here to discuss adjuvant therapy.   Detailed pathology is as follows. synoptic Summary 13: Lung - Resection Specimen Procedure:  Lobectomy Specimen Laterality:   Left Tumor Tumor Focality:   Single focus Tumor Site:  Upper lobe of lung Tumor Size Total Tumor Size:   2.5 Centimeters (cm) Histologic Type:   Adenocarcinoma with complex cribriform and fused glands Histologic Patterns Present:   Acinar - 15%; Micropapillary -5%;  Complex glands (cribriform and fused glands) - 80% Histologic Grade:   G3, poorly differentiated Spread Through Air Spaces (JARRETT):    Not identified Visceral Pleura Invasion:   Cannot be determined - Please see Note 1 in the main diagnosis Direct Invasion of Adjacent Structures:    Not identified Treatment Effect:   No known presurgical therapy Lymphovascular Invasion:   Lymphatic invasion present Margins Margin Status for Invasive Carcinoma:    All margins negative for invasive carcinoma Closest Margin(s) to Invasive Carcinoma:    Parenchymal Distance from Invasive Carcinoma to Closest Margin:    1.6 cm Margin Status for Non-Invasive Tumor:    Not applicable Regional Lymph Nodes Lymph Node(s) from Prior Procedures:    No known prior lymph node sampling performed Regional Lymph Node Status:   Tumor present in regional lymph node(s) Number of Lymph Nodes with Tumor:    2 Jj Site(s) with Tumor:   Left - Left level 11 lymph node and left level 12 lymph node near pulmonary artery Extranodal Extension:   Not identified Number of Lymph Nodes Examined:   15 Jj Site(s) Examined:   5: Subaortic / aortopulmonary (AP) / AP window;  8L: Para-esophageal;  9L: Pulmonary ligament; 10L: Hilar;  11L: Interlobar;  12L: Lobar;  13L: Segmental; Left - Left level 7, left level 7 near vagus, left level 10 lymph node near pulmonary artery, left level 12 lymph node near pulmonary artery, left level 10 lymph node near pulmonary vein, Pathologic Stage Classification (pTNM, AJCC 8th Edition) pT Category:   pT1c pN Category:   pN1 Additional Findings Additional Findings:   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   Pmhx : HTN, HLD Meds: Synthroid, Atorvastatin, Lisinopril. Allergies: None. Surgical Hx: Hysterectomy. Family Hx: None. Social Hx : quit Smoker 15 years ago smoked for 40 years 2 packs a day. Drinks alcohol occasionally.

## 2024-09-29 NOTE — HISTORY OF PRESENT ILLNESS
[de-identified] : 83-year-old female is here for recently diagnosed Lung cancer.  83 years old had  intermittent cough and cold saw her PMD had a chest x-ray done which showed left upper lobe opacity was given antibiotics repeat chest x-ray done few weeks later unchanged opacity underwent CT chest on August 1 which showed left upper lobe spiculated nodule and multiple bilateral nodular right lower lobe and left upper lobe underwent PET scan showed uptake in the left upper lobe nodule highly suspicious for malignancy last chest x-ray was 2008.  on 8/28/2024 she is S/P robotic assisted left VATs. with Intra-operative pathology of mediastinal lymph node returned positive for malignancy and patient underwent left upper lobe lobectomy. Final pathology was consistent with Stage IIB [id number] Adenocarcinoma of Left Lung.  EGFR mutation negative. She recovered well after the surgery. Postoperative period is uneventful. she is here to discuss adjuvant therapy.   Detailed pathology is as follows. synoptic Summary 13: Lung - Resection Specimen Procedure:  Lobectomy Specimen Laterality:   Left Tumor Tumor Focality:   Single focus Tumor Site:  Upper lobe of lung Tumor Size Total Tumor Size:   2.5 Centimeters (cm) Histologic Type:   Adenocarcinoma with complex cribriform and fused glands Histologic Patterns Present:   Acinar - 15%; Micropapillary -5%;  Complex glands (cribriform and fused glands) - 80% Histologic Grade:   G3, poorly differentiated Spread Through Air Spaces (JARRETT):    Not identified Visceral Pleura Invasion:   Cannot be determined - Please see Note 1 in the main diagnosis Direct Invasion of Adjacent Structures:    Not identified Treatment Effect:   No known presurgical therapy Lymphovascular Invasion:   Lymphatic invasion present Margins Margin Status for Invasive Carcinoma:    All margins negative for invasive carcinoma Closest Margin(s) to Invasive Carcinoma:    Parenchymal Distance from Invasive Carcinoma to Closest Margin:    1.6 cm Margin Status for Non-Invasive Tumor:    Not applicable Regional Lymph Nodes Lymph Node(s) from Prior Procedures:    No known prior lymph node sampling performed Regional Lymph Node Status:   Tumor present in regional lymph node(s) Number of Lymph Nodes with Tumor:    2 Jj Site(s) with Tumor:   Left - Left level 11 lymph node and left level 12 lymph node near pulmonary artery Extranodal Extension:   Not identified Number of Lymph Nodes Examined:   15 Jj Site(s) Examined:   5: Subaortic / aortopulmonary (AP) / AP window;  8L: Para-esophageal;  9L: Pulmonary ligament; 10L: Hilar;  11L: Interlobar;  12L: Lobar;  13L: Segmental; Left - Left level 7, left level 7 near vagus, left level 10 lymph node near pulmonary artery, left level 12 lymph node near pulmonary artery, left level 10 lymph node near pulmonary vein, Pathologic Stage Classification (pTNM, AJCC 8th Edition) pT Category:   pT1c pN Category:   pN1 Additional Findings Additional Findings:   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   Pmhx : HTN, HLD Meds: Synthroid, Atorvastatin, Lisinopril. Allergies: None. Surgical Hx: Hysterectomy. Family Hx: None. Social Hx : quit Smoker 15 years ago smoked for 40 years 2 packs a day. Drinks alcohol occasionally.

## 2024-09-30 DIAGNOSIS — C34.90 MALIGNANT NEOPLASM OF UNSPECIFIED PART OF UNSPECIFIED BRONCHUS OR LUNG: ICD-10-CM

## 2024-10-09 ENCOUNTER — APPOINTMENT (OUTPATIENT)
Age: 84
End: 2024-10-09
Payer: MEDICARE

## 2024-10-09 VITALS
HEART RATE: 91 BPM | HEIGHT: 60 IN | OXYGEN SATURATION: 99 % | DIASTOLIC BLOOD PRESSURE: 85 MMHG | RESPIRATION RATE: 14 BRPM | BODY MASS INDEX: 32.59 KG/M2 | WEIGHT: 166 LBS | TEMPERATURE: 98.4 F | SYSTOLIC BLOOD PRESSURE: 170 MMHG

## 2024-10-09 PROCEDURE — G2211 COMPLEX E/M VISIT ADD ON: CPT

## 2024-10-09 PROCEDURE — 99215 OFFICE O/P EST HI 40 MIN: CPT

## 2024-10-09 RX ORDER — FOLIC ACID 1 MG/1
1 TABLET ORAL
Qty: 90 | Refills: 2 | Status: ACTIVE | COMMUNITY
Start: 2024-10-09 | End: 1900-01-01

## 2024-10-09 RX ORDER — ONDANSETRON 8 MG/1
8 TABLET ORAL EVERY 8 HOURS
Qty: 60 | Refills: 3 | Status: ACTIVE | COMMUNITY
Start: 2024-10-09 | End: 1900-01-01

## 2024-10-15 ENCOUNTER — NON-APPOINTMENT (OUTPATIENT)
Age: 84
End: 2024-10-15

## 2024-10-15 DIAGNOSIS — C34.90 MALIGNANT NEOPLASM OF UNSPECIFIED PART OF UNSPECIFIED BRONCHUS OR LUNG: ICD-10-CM

## 2024-10-21 ENCOUNTER — APPOINTMENT (OUTPATIENT)
Age: 84
End: 2024-10-21

## 2024-12-15 ENCOUNTER — OUTPATIENT (OUTPATIENT)
Dept: OUTPATIENT SERVICES | Facility: HOSPITAL | Age: 84
LOS: 1 days | End: 2024-12-15
Payer: MEDICARE

## 2024-12-15 DIAGNOSIS — Z00.8 ENCOUNTER FOR OTHER GENERAL EXAMINATION: ICD-10-CM

## 2024-12-15 DIAGNOSIS — C34.90 MALIGNANT NEOPLASM OF UNSPECIFIED PART OF UNSPECIFIED BRONCHUS OR LUNG: ICD-10-CM

## 2024-12-15 DIAGNOSIS — Z98.890 OTHER SPECIFIED POSTPROCEDURAL STATES: Chronic | ICD-10-CM

## 2024-12-15 PROCEDURE — 71250 CT THORAX DX C-: CPT | Mod: 26

## 2024-12-15 PROCEDURE — 71250 CT THORAX DX C-: CPT

## 2024-12-16 DIAGNOSIS — C34.90 MALIGNANT NEOPLASM OF UNSPECIFIED PART OF UNSPECIFIED BRONCHUS OR LUNG: ICD-10-CM

## 2024-12-17 ENCOUNTER — OUTPATIENT (OUTPATIENT)
Dept: OUTPATIENT SERVICES | Facility: HOSPITAL | Age: 84
LOS: 1 days | End: 2024-12-17
Payer: MEDICARE

## 2024-12-17 DIAGNOSIS — Z12.31 ENCOUNTER FOR SCREENING MAMMOGRAM FOR MALIGNANT NEOPLASM OF BREAST: ICD-10-CM

## 2024-12-17 DIAGNOSIS — Z98.890 OTHER SPECIFIED POSTPROCEDURAL STATES: Chronic | ICD-10-CM

## 2024-12-17 PROCEDURE — 77063 BREAST TOMOSYNTHESIS BI: CPT | Mod: 26

## 2024-12-17 PROCEDURE — 77067 SCR MAMMO BI INCL CAD: CPT

## 2024-12-17 PROCEDURE — 77067 SCR MAMMO BI INCL CAD: CPT | Mod: 26

## 2024-12-17 PROCEDURE — 77063 BREAST TOMOSYNTHESIS BI: CPT

## 2024-12-18 DIAGNOSIS — Z12.31 ENCOUNTER FOR SCREENING MAMMOGRAM FOR MALIGNANT NEOPLASM OF BREAST: ICD-10-CM

## 2025-01-27 ENCOUNTER — OUTPATIENT (OUTPATIENT)
Dept: OUTPATIENT SERVICES | Facility: HOSPITAL | Age: 85
LOS: 1 days | End: 2025-01-27
Payer: MEDICARE

## 2025-01-27 ENCOUNTER — APPOINTMENT (OUTPATIENT)
Age: 85
End: 2025-01-27
Payer: MEDICARE

## 2025-01-27 VITALS
OXYGEN SATURATION: 97 % | SYSTOLIC BLOOD PRESSURE: 162 MMHG | HEIGHT: 60 IN | TEMPERATURE: 97.9 F | HEART RATE: 96 BPM | DIASTOLIC BLOOD PRESSURE: 78 MMHG | RESPIRATION RATE: 14 BRPM

## 2025-01-27 DIAGNOSIS — Z98.890 OTHER SPECIFIED POSTPROCEDURAL STATES: Chronic | ICD-10-CM

## 2025-01-27 DIAGNOSIS — R91.8 OTHER NONSPECIFIC ABNORMAL FINDING OF LUNG FIELD: ICD-10-CM

## 2025-01-27 DIAGNOSIS — C34.90 MALIGNANT NEOPLASM OF UNSPECIFIED PART OF UNSPECIFIED BRONCHUS OR LUNG: ICD-10-CM

## 2025-01-27 PROCEDURE — G2211 COMPLEX E/M VISIT ADD ON: CPT

## 2025-01-27 PROCEDURE — 99214 OFFICE O/P EST MOD 30 MIN: CPT

## 2025-01-28 DIAGNOSIS — C34.90 MALIGNANT NEOPLASM OF UNSPECIFIED PART OF UNSPECIFIED BRONCHUS OR LUNG: ICD-10-CM

## 2025-03-25 ENCOUNTER — EMERGENCY (EMERGENCY)
Facility: HOSPITAL | Age: 85
LOS: 0 days | Discharge: HOME | End: 2025-03-25
Attending: EMERGENCY MEDICINE
Payer: MEDICARE

## 2025-03-25 VITALS
WEIGHT: 169.09 LBS | RESPIRATION RATE: 17 BRPM | HEIGHT: 61 IN | TEMPERATURE: 97 F | SYSTOLIC BLOOD PRESSURE: 163 MMHG | DIASTOLIC BLOOD PRESSURE: 64 MMHG | HEART RATE: 87 BPM | OXYGEN SATURATION: 99 %

## 2025-03-25 DIAGNOSIS — S00.31XA ABRASION OF NOSE, INITIAL ENCOUNTER: ICD-10-CM
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
## 2025-03-25 DIAGNOSIS — Z98.890 OTHER SPECIFIED POSTPROCEDURAL STATES: Chronic | ICD-10-CM

## 2025-03-25 DIAGNOSIS — E78.5 HYPERLIPIDEMIA, UNSPECIFIED: ICD-10-CM

## 2025-03-25 DIAGNOSIS — E89.0 POSTPROCEDURAL HYPOTHYROIDISM: ICD-10-CM

## 2025-03-25 DIAGNOSIS — S02.2XXA FRACTURE OF NASAL BONES, INITIAL ENCOUNTER FOR CLOSED FRACTURE: ICD-10-CM

## 2025-03-25 DIAGNOSIS — I10 ESSENTIAL (PRIMARY) HYPERTENSION: ICD-10-CM

## 2025-03-25 DIAGNOSIS — Z85.850 PERSONAL HISTORY OF MALIGNANT NEOPLASM OF THYROID: ICD-10-CM
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
## 2025-03-25 DIAGNOSIS — Z23 ENCOUNTER FOR IMMUNIZATION: ICD-10-CM

## 2025-03-25 DIAGNOSIS — E03.9 HYPOTHYROIDISM, UNSPECIFIED: ICD-10-CM

## 2025-03-25 PROCEDURE — 82962 GLUCOSE BLOOD TEST: CPT

## 2025-03-25 PROCEDURE — 70450 CT HEAD/BRAIN W/O DYE: CPT | Mod: MC

## 2025-03-25 PROCEDURE — 70450 CT HEAD/BRAIN W/O DYE: CPT | Mod: 26

## 2025-03-25 PROCEDURE — 99284 EMERGENCY DEPT VISIT MOD MDM: CPT | Mod: 25

## 2025-03-25 PROCEDURE — 90714 TD VACC NO PRESV 7 YRS+ IM: CPT

## 2025-03-25 PROCEDURE — 90471 IMMUNIZATION ADMIN: CPT

## 2025-03-25 PROCEDURE — 70486 CT MAXILLOFACIAL W/O DYE: CPT | Mod: MC

## 2025-03-25 PROCEDURE — 70486 CT MAXILLOFACIAL W/O DYE: CPT | Mod: 26

## 2025-03-25 PROCEDURE — 99284 EMERGENCY DEPT VISIT MOD MDM: CPT

## 2025-03-25 RX ORDER — TETANUS AND DIPHTHERIA TOXOIDS ADSORBED 2; 2 [LF]/.5ML; [LF]/.5ML
0.5 INJECTION INTRAMUSCULAR ONCE
Refills: 0 | Status: COMPLETED | OUTPATIENT
Start: 2025-03-25 | End: 2025-03-25

## 2025-03-25 RX ADMIN — TETANUS AND DIPHTHERIA TOXOIDS ADSORBED 0.5 MILLILITER(S): 2; 2 INJECTION INTRAMUSCULAR at 13:57

## 2025-03-25 NOTE — ED PROVIDER NOTE - CARE PLAN
Principal Discharge DX:	Fall   1 Principal Discharge DX:	Fall  Secondary Diagnosis:	Nasal bone fracture

## 2025-03-25 NOTE — ED PROVIDER NOTE - CLINICAL SUMMARY MEDICAL DECISION MAKING FREE TEXT BOX
84-year-old female nonblood thinners here for eval status post mechanical fall falling forward hitting her face and nose against the floor.  Patient denies any other complaints besides facial trauma.  Agree with above exam.  Impression  Patient here status post fall found to have a nasal bone fracture CT head negative.  Patient ambulatory in ED no other traumatic injuries identified on exam patient stable for discharge.

## 2025-03-25 NOTE — ED ADULT TRIAGE NOTE - CHIEF COMPLAINT QUOTE
Pt BIB neighbor s/p fall on the pavement earlier today. Pt states she misstepped and fell forward, pt braced her fall w/ her hands, +abrasions & swelling to nose. Denies LOC. Not taking AC. Pt BIB neighbor s/p fall on the pavement earlier today. Pt states she misstepped and fell forward. Pt braced her fall w/ her hands, +abrasions & swelling to nose. Denies LOC. Not taking AC. Triage LX=823.

## 2025-03-25 NOTE — ED PROVIDER NOTE - PHYSICAL EXAMINATION
CONSTITUTIONAL: well developed, nontoxic appearing, in no acute distress, speaking in full sentences  SKIN: warm, dry, no rash  HEENT: normocephalic, no conjunctival erythema, moist mucous membranes, patent airway, small abrasion over the nose, R nare with dry blood, no septal hematoma, no septal deviation.    NECK: supple. No midline C/T spine tenderness   CV:  regular rate  RESP: normal work of breathing, ctab   ABD: nondistended, soft, nontender   MSK: moves all extremities, no cyanosis, no edema  NEURO: alert, oriented, grossly unremarkable  PSYCH: cooperative, appropriate

## 2025-03-25 NOTE — ED PROVIDER NOTE - PATIENT PORTAL LINK FT
You can access the FollowMyHealth Patient Portal offered by [facility] by registering at the following website: [email or web address]. By joining [facility]’s FollowMyHealth portal, you will also be able to view your health information using other applications (apps) compatible with our system.

## 2025-03-25 NOTE — ED ADULT NURSE NOTE - NSFALLUNIVINTERV_ED_ALL_ED
Bed/Stretcher in lowest position, wheels locked, appropriate side rails in place/Call bell, personal items and telephone in reach/Instruct patient to call for assistance before getting out of bed/chair/stretcher/Non-slip footwear applied when patient is off stretcher/[place]rest to call system/Physically safe environment - no spills, clutter or unnecessary equipment/Purposeful proactive rounding/Room/bathroom lighting operational, light cord in reach

## 2025-03-25 NOTE — ED ADULT NURSE NOTE - CHIEF COMPLAINT QUOTE
Pt BIB neighbor s/p fall on the pavement earlier today. Pt states she misstepped and fell forward. Pt braced her fall w/ her hands, +abrasions & swelling to nose. Denies LOC. Not taking AC. Triage PL=955.

## 2025-03-25 NOTE — ED PROVIDER NOTE - PROGRESS NOTE DETAILS
GT:  Patient updated on imaging findings. Given strict return precautions and discussed importance of follow up with ENT for nondisplaced nasal fracture.

## 2025-03-25 NOTE — ED PROVIDER NOTE - OBJECTIVE STATEMENT
84-year-old female past medical history significant for hypertension, hyperlipidemia, hypothyroidism status post thyroid cancer and thyroidectomy presents for evaluation status post fall.  The patient was walking on her driveway this morning when she slipped and fell forward.  She was able to brace her fall with her hands but states that her nose struck the ground.  Denies LOC.  No nausea, vomiting, neck pain, headaches, dizziness, chest wall pain.  She denies having any pain at this time but just an abrasion and dried blood to her nose.  She is not on any blood thinners.

## 2025-03-25 NOTE — ED PROVIDER NOTE - NSFOLLOWUPINSTRUCTIONS_ED_ALL_ED_FT
Our Emergency Department Referral Coordinators will be reaching out to you in the next 24-48 hours from 9:00am to 5:00pm to schedule a follow up appointment. Please expect a phone call from the hospital in that time frame. If you do not receive a call or if you have any questions or concerns, you can reach them at   [phone]      Nasal Fracture    WHAT YOU NEED TO KNOW:    What is a nasal fracture? A nasal fracture is a crack or break in your nose. You may have a break in the upper nose (bridge), the side, or the septum. The septum is in the middle of the nose and divides your nostrils.    What are the signs and symptoms of a nasal fracture?    Pain and swelling    Nosebleed    Deformed nose    Crackling sound when you touch or move your nose    Bruising on your nose or under your eyes  How is a nasal fracture diagnosed? Your healthcare provider will ask you when, where, and how the injury occurred. You may need any of the following:    A nasal exam will be done to check your injury. You will be given pain medicine before your healthcare provider touches and looks at the outside and inside of your nose. Your provider will remove blood clots and check for hematomas (collections of blood).  Septal Hematoma       An x-ray or CT may show the nasal fracture. You may be given contrast liquid before the scan. Tell the healthcare provider if you have ever had an allergic reaction to contrast liquid.  How is a nasal fracture treated?    Medicine may be given to decrease pain or help prevent a bacterial infection. Ask how to take pain medicine safely. Medicine may also be given to decrease nasal swelling and help make breathing easier.    Wound care may help stop bleeding. If you have a hematoma inside your nose, it will be drained. Healthcare providers may place packing (gauze or other material) inside your nose to soak up blood.    Closed reduction may be done to put your nasal bones back into the correct position. Local or general anesthesia is used during this procedure. This procedure may be done right away or several days after your injury when the swelling has gone down. Surgery (open reduction) to put your bones back into place may be needed for severe fractures.    Splints or packing help keep your nose in place for 7 to 10 days after a reduction. Ask your healthcare provider how to care for your wounds, splint, or packing.  How do I care for my nasal fracture at home?    Apply ice on your nose for 15 to 20 minutes every hour or as directed. Use an ice pack, or put crushed ice in a plastic bag. Cover it with a towel. Ice helps prevent tissue damage and decreases swelling and pain.    Elevate your head when you lie down. This will help decrease swelling and pain. You may need to see a specialist 3 to 5 days later for tests or more treatment after swelling has gone down.  Elevate Head (Adult)      Protect your nose to prevent bleeding, bruising, or another fracture. Try not to bump your nose on anything. You may not be able to play sports for up to 6 weeks.  When should I seek immediate care?    You feel like one or both of your nasal passages are blocked and you have trouble breathing.    Clear fluid is leaking from your nose.    You have severe nose pain, even after you take medicine.    You have double vision or have problems moving your eyes.  When should I call my doctor?    You have a fever.    You continue to have nosebleeds.    You have a headache that gets worse, even after you take pain medicine.    Your splint or packing is loose.    You have questions or concerns about your condition or care.  CARE AGREEMENT:    You have the right to help plan your care. Learn about your health condition and how it may be treated. Discuss treatment options with your healthcare providers to decide what care you want to receive. You always have the right to refuse treatment

## 2025-04-16 ENCOUNTER — OUTPATIENT (OUTPATIENT)
Dept: OUTPATIENT SERVICES | Facility: HOSPITAL | Age: 85
LOS: 1 days | End: 2025-04-16
Payer: MEDICARE

## 2025-04-16 DIAGNOSIS — Z13.820 ENCOUNTER FOR SCREENING FOR OSTEOPOROSIS: ICD-10-CM

## 2025-04-16 DIAGNOSIS — Z00.8 ENCOUNTER FOR OTHER GENERAL EXAMINATION: ICD-10-CM

## 2025-04-16 DIAGNOSIS — Z98.890 OTHER SPECIFIED POSTPROCEDURAL STATES: Chronic | ICD-10-CM

## 2025-04-16 PROCEDURE — 77080 DXA BONE DENSITY AXIAL: CPT | Mod: 26

## 2025-04-16 PROCEDURE — 77080 DXA BONE DENSITY AXIAL: CPT

## 2025-04-17 ENCOUNTER — NON-APPOINTMENT (OUTPATIENT)
Age: 85
End: 2025-04-17

## 2025-04-17 DIAGNOSIS — Z13.820 ENCOUNTER FOR SCREENING FOR OSTEOPOROSIS: ICD-10-CM

## 2025-04-19 ENCOUNTER — OUTPATIENT (OUTPATIENT)
Dept: OUTPATIENT SERVICES | Facility: HOSPITAL | Age: 85
LOS: 1 days | End: 2025-04-19
Payer: MEDICARE

## 2025-04-19 ENCOUNTER — RESULT REVIEW (OUTPATIENT)
Age: 85
End: 2025-04-19

## 2025-04-19 DIAGNOSIS — Z98.890 OTHER SPECIFIED POSTPROCEDURAL STATES: Chronic | ICD-10-CM

## 2025-04-19 DIAGNOSIS — Z00.8 ENCOUNTER FOR OTHER GENERAL EXAMINATION: ICD-10-CM

## 2025-04-19 DIAGNOSIS — R91.8 OTHER NONSPECIFIC ABNORMAL FINDING OF LUNG FIELD: ICD-10-CM

## 2025-04-19 PROCEDURE — 71250 CT THORAX DX C-: CPT | Mod: 26

## 2025-04-19 PROCEDURE — 71250 CT THORAX DX C-: CPT

## 2025-04-20 DIAGNOSIS — R91.8 OTHER NONSPECIFIC ABNORMAL FINDING OF LUNG FIELD: ICD-10-CM

## 2025-04-28 ENCOUNTER — APPOINTMENT (OUTPATIENT)
Age: 85
End: 2025-04-28
Payer: MEDICARE

## 2025-04-28 ENCOUNTER — OUTPATIENT (OUTPATIENT)
Dept: OUTPATIENT SERVICES | Facility: HOSPITAL | Age: 85
LOS: 1 days | End: 2025-04-28
Payer: MEDICARE

## 2025-04-28 VITALS
RESPIRATION RATE: 14 BRPM | TEMPERATURE: 98 F | HEART RATE: 68 BPM | HEIGHT: 60 IN | OXYGEN SATURATION: 97 % | WEIGHT: 174 LBS | BODY MASS INDEX: 34.16 KG/M2 | DIASTOLIC BLOOD PRESSURE: 88 MMHG | SYSTOLIC BLOOD PRESSURE: 186 MMHG

## 2025-04-28 DIAGNOSIS — C34.90 MALIGNANT NEOPLASM OF UNSPECIFIED PART OF UNSPECIFIED BRONCHUS OR LUNG: ICD-10-CM

## 2025-04-28 DIAGNOSIS — Z98.890 OTHER SPECIFIED POSTPROCEDURAL STATES: Chronic | ICD-10-CM

## 2025-04-28 LAB
ALBUMIN SERPL ELPH-MCNC: 4.1 G/DL
ALP BLD-CCNC: 65 U/L
ALT SERPL-CCNC: 12 U/L
ANION GAP SERPL CALC-SCNC: 9 MMOL/L
AST SERPL-CCNC: 16 U/L
AUTO BASOPHILS #: 0.02 K/UL
AUTO BASOPHILS %: 0.4 %
AUTO EOSINOPHILS #: 0.02 K/UL
AUTO EOSINOPHILS %: 0.4 %
AUTO IMMATURE GRANULOCYTES #: 0.01 K/UL
AUTO LYMPHOCYTES #: 1.56 K/UL
AUTO LYMPHOCYTES %: 30.4 %
AUTO MONOCYTES #: 0.46 K/UL
AUTO MONOCYTES %: 8.9 %
AUTO NEUTROPHILS #: 3.07 K/UL
AUTO NEUTROPHILS %: 59.7 %
AUTO NRBC #: 0 K/UL
BILIRUB SERPL-MCNC: 0.7 MG/DL
BUN SERPL-MCNC: 18 MG/DL
CALCIUM SERPL-MCNC: 9.8 MG/DL
CHLORIDE SERPL-SCNC: 105 MMOL/L
CO2 SERPL-SCNC: 28 MMOL/L
CREAT SERPL-MCNC: 0.8 MG/DL
EGFRCR SERPLBLD CKD-EPI 2021: 73 ML/MIN/1.73M2
GLUCOSE SERPL-MCNC: 97 MG/DL
HCT VFR BLD CALC: 42.6 %
HGB BLD-MCNC: 14 G/DL
IMM GRANULOCYTES NFR BLD AUTO: 0.2 %
MAN DIFF?: NORMAL
MCHC RBC-ENTMCNC: 30 PG
MCHC RBC-ENTMCNC: 32.9 G/DL
MCV RBC AUTO: 91.4 FL
PLATELET # BLD AUTO: 242 K/UL
PMV BLD AUTO: 0 /100 WBCS
PMV BLD: 9.6 FL
POTASSIUM SERPL-SCNC: 4.6 MMOL/L
PROT SERPL-MCNC: 7.3 G/DL
RBC # BLD: 4.66 M/UL
RBC # FLD: 13.9 %
SODIUM SERPL-SCNC: 142 MMOL/L
WBC # FLD AUTO: 5.14 K/UL

## 2025-04-28 PROCEDURE — G2211 COMPLEX E/M VISIT ADD ON: CPT

## 2025-04-28 PROCEDURE — 80053 COMPREHEN METABOLIC PANEL: CPT

## 2025-04-28 PROCEDURE — 99213 OFFICE O/P EST LOW 20 MIN: CPT

## 2025-04-28 PROCEDURE — 85025 COMPLETE CBC W/AUTO DIFF WBC: CPT

## 2025-04-29 DIAGNOSIS — C34.90 MALIGNANT NEOPLASM OF UNSPECIFIED PART OF UNSPECIFIED BRONCHUS OR LUNG: ICD-10-CM
